# Patient Record
Sex: MALE | Race: OTHER | NOT HISPANIC OR LATINO | URBAN - METROPOLITAN AREA
[De-identification: names, ages, dates, MRNs, and addresses within clinical notes are randomized per-mention and may not be internally consistent; named-entity substitution may affect disease eponyms.]

---

## 2015-09-10 RX ORDER — FAMOTIDINE 10 MG/ML
1 INJECTION INTRAVENOUS
Qty: 0 | Refills: 0 | DISCHARGE
Start: 2015-09-10

## 2015-09-10 RX ORDER — ATORVASTATIN CALCIUM 80 MG/1
1 TABLET, FILM COATED ORAL
Qty: 0 | Refills: 0 | DISCHARGE
Start: 2015-09-10

## 2017-07-24 ENCOUNTER — INPATIENT (INPATIENT)
Facility: HOSPITAL | Age: 57
LOS: 0 days | Discharge: LEFT BEFORE DISCHARGE | End: 2017-07-25
Attending: INTERNAL MEDICINE | Admitting: INTERNAL MEDICINE
Payer: MEDICAID

## 2017-07-24 VITALS
SYSTOLIC BLOOD PRESSURE: 117 MMHG | OXYGEN SATURATION: 100 % | TEMPERATURE: 98 F | RESPIRATION RATE: 20 BRPM | HEART RATE: 80 BPM | DIASTOLIC BLOOD PRESSURE: 80 MMHG

## 2017-07-24 DIAGNOSIS — R07.9 CHEST PAIN, UNSPECIFIED: ICD-10-CM

## 2017-07-24 LAB
ALBUMIN SERPL ELPH-MCNC: 4.4 G/DL — SIGNIFICANT CHANGE UP (ref 3.3–5)
ALP SERPL-CCNC: 71 U/L — SIGNIFICANT CHANGE UP (ref 40–120)
ALT FLD-CCNC: 25 U/L — SIGNIFICANT CHANGE UP (ref 4–41)
AST SERPL-CCNC: 22 U/L — SIGNIFICANT CHANGE UP (ref 4–40)
BASOPHILS # BLD AUTO: 0.04 K/UL — SIGNIFICANT CHANGE UP (ref 0–0.2)
BASOPHILS NFR BLD AUTO: 0.5 % — SIGNIFICANT CHANGE UP (ref 0–2)
BASOPHILS NFR SPEC: 0 % — SIGNIFICANT CHANGE UP (ref 0–2)
BILIRUB SERPL-MCNC: 0.4 MG/DL — SIGNIFICANT CHANGE UP (ref 0.2–1.2)
BUN SERPL-MCNC: 23 MG/DL — SIGNIFICANT CHANGE UP (ref 7–23)
CALCIUM SERPL-MCNC: 9 MG/DL — SIGNIFICANT CHANGE UP (ref 8.4–10.5)
CHLORIDE SERPL-SCNC: 103 MMOL/L — SIGNIFICANT CHANGE UP (ref 98–107)
CK MB BLD-MCNC: 5.72 NG/ML — SIGNIFICANT CHANGE UP (ref 1–6.6)
CK SERPL-CCNC: 83 U/L — SIGNIFICANT CHANGE UP (ref 30–200)
CO2 SERPL-SCNC: 22 MMOL/L — SIGNIFICANT CHANGE UP (ref 22–31)
CREAT SERPL-MCNC: 1.28 MG/DL — SIGNIFICANT CHANGE UP (ref 0.5–1.3)
EOSINOPHIL # BLD AUTO: 0.01 K/UL — SIGNIFICANT CHANGE UP (ref 0–0.5)
EOSINOPHIL NFR BLD AUTO: 0.1 % — SIGNIFICANT CHANGE UP (ref 0–6)
EOSINOPHIL NFR FLD: 0 % — SIGNIFICANT CHANGE UP (ref 0–6)
GLUCOSE SERPL-MCNC: 117 MG/DL — HIGH (ref 70–99)
HCT VFR BLD CALC: 44.7 % — SIGNIFICANT CHANGE UP (ref 39–50)
HGB BLD-MCNC: 15.5 G/DL — SIGNIFICANT CHANGE UP (ref 13–17)
IMM GRANULOCYTES # BLD AUTO: 0.35 # — SIGNIFICANT CHANGE UP
IMM GRANULOCYTES NFR BLD AUTO: 4.1 % — HIGH (ref 0–1.5)
LYMPHOCYTES # BLD AUTO: 1.74 K/UL — SIGNIFICANT CHANGE UP (ref 1–3.3)
LYMPHOCYTES # BLD AUTO: 20.3 % — SIGNIFICANT CHANGE UP (ref 13–44)
LYMPHOCYTES NFR SPEC AUTO: 21 % — SIGNIFICANT CHANGE UP (ref 13–44)
MANUAL SMEAR VERIFICATION: SIGNIFICANT CHANGE UP
MCHC RBC-ENTMCNC: 31.6 PG — SIGNIFICANT CHANGE UP (ref 27–34)
MCHC RBC-ENTMCNC: 34.7 % — SIGNIFICANT CHANGE UP (ref 32–36)
MCV RBC AUTO: 91 FL — SIGNIFICANT CHANGE UP (ref 80–100)
MONOCYTES # BLD AUTO: 0.66 K/UL — SIGNIFICANT CHANGE UP (ref 0–0.9)
MONOCYTES NFR BLD AUTO: 7.7 % — SIGNIFICANT CHANGE UP (ref 2–14)
MONOCYTES NFR BLD: 7 % — SIGNIFICANT CHANGE UP (ref 2–9)
MORPHOLOGY BLD-IMP: NORMAL — SIGNIFICANT CHANGE UP
NEUTROPHIL AB SER-ACNC: 71 % — SIGNIFICANT CHANGE UP (ref 43–77)
NEUTROPHILS # BLD AUTO: 5.79 K/UL — SIGNIFICANT CHANGE UP (ref 1.8–7.4)
NEUTROPHILS NFR BLD AUTO: 67.3 % — SIGNIFICANT CHANGE UP (ref 43–77)
NEUTS BAND # BLD: 1 % — SIGNIFICANT CHANGE UP (ref 0–6)
NRBC # FLD: 0 — SIGNIFICANT CHANGE UP
NT-PROBNP SERPL-SCNC: 102.5 PG/ML — SIGNIFICANT CHANGE UP
PLATELET # BLD AUTO: 205 K/UL — SIGNIFICANT CHANGE UP (ref 150–400)
PLATELET COUNT - ESTIMATE: NORMAL — SIGNIFICANT CHANGE UP
PMV BLD: 10.8 FL — SIGNIFICANT CHANGE UP (ref 7–13)
POTASSIUM SERPL-MCNC: 5.3 MMOL/L — SIGNIFICANT CHANGE UP (ref 3.5–5.3)
POTASSIUM SERPL-SCNC: 5.3 MMOL/L — SIGNIFICANT CHANGE UP (ref 3.5–5.3)
PROT SERPL-MCNC: 7.1 G/DL — SIGNIFICANT CHANGE UP (ref 6–8.3)
RBC # BLD: 4.91 M/UL — SIGNIFICANT CHANGE UP (ref 4.2–5.8)
RBC # FLD: 13.7 % — SIGNIFICANT CHANGE UP (ref 10.3–14.5)
REVIEW TO FOLLOW: YES — SIGNIFICANT CHANGE UP
SODIUM SERPL-SCNC: 139 MMOL/L — SIGNIFICANT CHANGE UP (ref 135–145)
TROPONIN T SERPL-MCNC: < 0.06 NG/ML — SIGNIFICANT CHANGE UP (ref 0–0.06)
WBC # BLD: 8.59 K/UL — SIGNIFICANT CHANGE UP (ref 3.8–10.5)
WBC # FLD AUTO: 8.59 K/UL — SIGNIFICANT CHANGE UP (ref 3.8–10.5)

## 2017-07-24 PROCEDURE — 71020: CPT | Mod: 26

## 2017-07-24 RX ORDER — ASPIRIN/CALCIUM CARB/MAGNESIUM 324 MG
162 TABLET ORAL ONCE
Qty: 0 | Refills: 0 | Status: COMPLETED | OUTPATIENT
Start: 2017-07-24 | End: 2017-07-24

## 2017-07-24 RX ADMIN — Medication 162 MILLIGRAM(S): at 19:23

## 2017-07-24 NOTE — ED PROVIDER NOTE - MEDICAL DECISION MAKING DETAILS
57 M with intermittent chest pain x months, that worsened today, with leg edema. VSS, exam wnl. EKG, cxr, CE and pro BNP

## 2017-07-24 NOTE — ED PROVIDER NOTE - PMH
CAD (coronary artery disease)    HLD (hyperlipidemia)    HTN (hypertension)    Skin disorder  Eczema on b/l LE

## 2017-07-24 NOTE — ED PROVIDER NOTE - PROGRESS NOTE DETAILS
Spoke with Dr beckwith, says patient had stress test from May showing inf/lat wall motion abnormality. Requesting admission to his service

## 2017-07-24 NOTE — ED ADULT NURSE NOTE - OBJECTIVE STATEMENT
Pt c/o intermittent CP 1-2/10 over several months.  Appears comfortable.  A+OX3, family at bedside, Kyrgyz speaking, pt ok with family translating.  CM placed.  EKG done.  Labs obtained and sent as ordered.  #20g SL R AC placed.  Pt to XR

## 2017-07-24 NOTE — ED PROVIDER NOTE - OBJECTIVE STATEMENT
Marlys  #[id number] 57 M with CAD s/p stent, with chest tightness and SOB x months that is improved with walking. Also with epigstric burning pain. Bilat LE swelling that noticed today, and increase in severity of chest tightness so came to ED. No fevers/chils/n/v/d. Marlys  #[id number] 57 M with CAD s/p stent, with chest tightness and SOB x months that is improved with walking. Also with epigstric burning pain. Bilat LE swelling that noticed today, and increase in severity of chest tightness so came to ED. No fevers/chils/n/v/d. Also follows with Dr douglas Marlys  #[id number] 57 M with CAD s/p stent, with chest tightness and SOB x months that is improved with walking. Also with epigstric burning pain. Bilat LE swelling that noticed today, and increase in severity of chest tightness so came to ED. No fevers/chils/n/v/d. Also follows with Dr sewell - cardiology. Was seen this weekm, told he may need angio but patient still with leg swelling so didn't trust the cardiologist Marlys  #[id number] 57 M with CAD s/p stent, with chest tightness and SOB x months that is improved with walking. Also with epigstric burning pain. Bilat LE swelling that noticed today, and increase in severity of chest tightness so came to ED. No fevers/chils/n/v/d. Also follows with Dr sewell - cardiology. Was seen this weekm, told he may need angio but patient still with leg swelling since his visit so he doesn't trust the cardiologist, and is requesting to see Dr hackett who did his cath. Currently pain free. Marlys  #[id number] 57 M with CAD s/p stent, with chest tightness and SOB x months that is improved with walking. Also with epigstric burning pain. Bilat LE swelling that noticed today, and increase in severity of chest tightness so came to ED. No fevers/chils/n/v/d. Also follows with Dr Michaels - cardiology. Was seen this week, told he may need cath but patient still with leg swelling since his visit so he doesn't trust the cardiologist, and is requesting to see Dr hackett who did his cath. Currently pain free.

## 2017-07-24 NOTE — ED PROVIDER NOTE - ATTENDING CONTRIBUTION TO CARE
agree with resident note  57 M with CAD s/p stent, with chest tightness and SOB who presents wanting to see his interventionalist.  States insurance has changed and can not see him as outpatient.  States CP improves with walking.  States this feels like when he had initial stent placed.      PE: well appearing, VSS, CTAB/L; s1 s2 no m/r/g abd soft/NT/ND ext: no edema

## 2017-07-25 ENCOUNTER — TRANSCRIPTION ENCOUNTER (OUTPATIENT)
Age: 57
End: 2017-07-25

## 2017-07-25 VITALS
HEART RATE: 65 BPM | DIASTOLIC BLOOD PRESSURE: 72 MMHG | SYSTOLIC BLOOD PRESSURE: 113 MMHG | TEMPERATURE: 98 F | RESPIRATION RATE: 18 BRPM | OXYGEN SATURATION: 99 %

## 2017-07-25 DIAGNOSIS — I10 ESSENTIAL (PRIMARY) HYPERTENSION: ICD-10-CM

## 2017-07-25 DIAGNOSIS — R07.9 CHEST PAIN, UNSPECIFIED: ICD-10-CM

## 2017-07-25 DIAGNOSIS — E78.5 HYPERLIPIDEMIA, UNSPECIFIED: ICD-10-CM

## 2017-07-25 DIAGNOSIS — I25.10 ATHEROSCLEROTIC HEART DISEASE OF NATIVE CORONARY ARTERY WITHOUT ANGINA PECTORIS: ICD-10-CM

## 2017-07-25 DIAGNOSIS — Z29.9 ENCOUNTER FOR PROPHYLACTIC MEASURES, UNSPECIFIED: ICD-10-CM

## 2017-07-25 LAB
BUN SERPL-MCNC: 18 MG/DL — SIGNIFICANT CHANGE UP (ref 7–23)
CALCIUM SERPL-MCNC: 8.8 MG/DL — SIGNIFICANT CHANGE UP (ref 8.4–10.5)
CHLORIDE SERPL-SCNC: 101 MMOL/L — SIGNIFICANT CHANGE UP (ref 98–107)
CHOLEST SERPL-MCNC: 139 MG/DL — SIGNIFICANT CHANGE UP (ref 120–199)
CK MB BLD-MCNC: 4.25 NG/ML — SIGNIFICANT CHANGE UP (ref 1–6.6)
CK SERPL-CCNC: 51 U/L — SIGNIFICANT CHANGE UP (ref 30–200)
CO2 SERPL-SCNC: 22 MMOL/L — SIGNIFICANT CHANGE UP (ref 22–31)
CREAT SERPL-MCNC: 1.11 MG/DL — SIGNIFICANT CHANGE UP (ref 0.5–1.3)
GLUCOSE SERPL-MCNC: 115 MG/DL — HIGH (ref 70–99)
HBA1C BLD-MCNC: 5.6 % — SIGNIFICANT CHANGE UP (ref 4–5.6)
HCT VFR BLD CALC: 41.9 % — SIGNIFICANT CHANGE UP (ref 39–50)
HDLC SERPL-MCNC: 58 MG/DL — HIGH (ref 35–55)
HGB BLD-MCNC: 14.4 G/DL — SIGNIFICANT CHANGE UP (ref 13–17)
LIPID PNL WITH DIRECT LDL SERPL: 61 MG/DL — SIGNIFICANT CHANGE UP
MAGNESIUM SERPL-MCNC: 1.7 MG/DL — SIGNIFICANT CHANGE UP (ref 1.6–2.6)
MCHC RBC-ENTMCNC: 31.2 PG — SIGNIFICANT CHANGE UP (ref 27–34)
MCHC RBC-ENTMCNC: 34.4 % — SIGNIFICANT CHANGE UP (ref 32–36)
MCV RBC AUTO: 90.7 FL — SIGNIFICANT CHANGE UP (ref 80–100)
NRBC # FLD: 0 — SIGNIFICANT CHANGE UP
PHOSPHATE SERPL-MCNC: 4.5 MG/DL — SIGNIFICANT CHANGE UP (ref 2.5–4.5)
PLATELET # BLD AUTO: 184 K/UL — SIGNIFICANT CHANGE UP (ref 150–400)
PMV BLD: 11.2 FL — SIGNIFICANT CHANGE UP (ref 7–13)
POTASSIUM SERPL-MCNC: 3.5 MMOL/L — SIGNIFICANT CHANGE UP (ref 3.5–5.3)
POTASSIUM SERPL-SCNC: 3.5 MMOL/L — SIGNIFICANT CHANGE UP (ref 3.5–5.3)
RBC # BLD: 4.62 M/UL — SIGNIFICANT CHANGE UP (ref 4.2–5.8)
RBC # FLD: 13.9 % — SIGNIFICANT CHANGE UP (ref 10.3–14.5)
SODIUM SERPL-SCNC: 139 MMOL/L — SIGNIFICANT CHANGE UP (ref 135–145)
TRIGL SERPL-MCNC: 123 MG/DL — SIGNIFICANT CHANGE UP (ref 10–149)
TROPONIN T SERPL-MCNC: < 0.06 NG/ML — SIGNIFICANT CHANGE UP (ref 0–0.06)
TSH SERPL-MCNC: 1.8 UIU/ML — SIGNIFICANT CHANGE UP (ref 0.27–4.2)
WBC # BLD: 11.83 K/UL — HIGH (ref 3.8–10.5)
WBC # FLD AUTO: 11.83 K/UL — HIGH (ref 3.8–10.5)

## 2017-07-25 RX ORDER — ISOSORBIDE MONONITRATE 60 MG/1
60 TABLET, EXTENDED RELEASE ORAL DAILY
Qty: 0 | Refills: 0 | Status: DISCONTINUED | OUTPATIENT
Start: 2017-07-25 | End: 2017-07-25

## 2017-07-25 RX ORDER — LOSARTAN POTASSIUM 100 MG/1
100 TABLET, FILM COATED ORAL DAILY
Qty: 0 | Refills: 0 | Status: DISCONTINUED | OUTPATIENT
Start: 2017-07-25 | End: 2017-07-25

## 2017-07-25 RX ORDER — RANOLAZINE 500 MG/1
500 TABLET, FILM COATED, EXTENDED RELEASE ORAL
Qty: 0 | Refills: 0 | Status: DISCONTINUED | OUTPATIENT
Start: 2017-07-25 | End: 2017-07-25

## 2017-07-25 RX ORDER — HEPARIN SODIUM 5000 [USP'U]/ML
5000 INJECTION INTRAVENOUS; SUBCUTANEOUS EVERY 12 HOURS
Qty: 0 | Refills: 0 | Status: DISCONTINUED | OUTPATIENT
Start: 2017-07-25 | End: 2017-07-25

## 2017-07-25 RX ORDER — METOPROLOL TARTRATE 50 MG
150 TABLET ORAL DAILY
Qty: 0 | Refills: 0 | Status: DISCONTINUED | OUTPATIENT
Start: 2017-07-25 | End: 2017-07-25

## 2017-07-25 RX ORDER — FAMOTIDINE 10 MG/ML
20 INJECTION INTRAVENOUS DAILY
Qty: 0 | Refills: 0 | Status: DISCONTINUED | OUTPATIENT
Start: 2017-07-25 | End: 2017-07-25

## 2017-07-25 RX ORDER — ATORVASTATIN CALCIUM 80 MG/1
80 TABLET, FILM COATED ORAL AT BEDTIME
Qty: 0 | Refills: 0 | Status: DISCONTINUED | OUTPATIENT
Start: 2017-07-25 | End: 2017-07-25

## 2017-07-25 RX ORDER — PRASUGREL 5 MG/1
10 TABLET, FILM COATED ORAL DAILY
Qty: 0 | Refills: 0 | Status: DISCONTINUED | OUTPATIENT
Start: 2017-07-25 | End: 2017-07-25

## 2017-07-25 RX ORDER — ASPIRIN/CALCIUM CARB/MAGNESIUM 324 MG
81 TABLET ORAL DAILY
Qty: 0 | Refills: 0 | Status: DISCONTINUED | OUTPATIENT
Start: 2017-07-25 | End: 2017-07-25

## 2017-07-25 RX ADMIN — Medication 150 MILLIGRAM(S): at 06:03

## 2017-07-25 RX ADMIN — PRASUGREL 10 MILLIGRAM(S): 5 TABLET, FILM COATED ORAL at 13:08

## 2017-07-25 RX ADMIN — FAMOTIDINE 20 MILLIGRAM(S): 10 INJECTION INTRAVENOUS at 13:56

## 2017-07-25 RX ADMIN — Medication 81 MILLIGRAM(S): at 13:08

## 2017-07-25 RX ADMIN — ISOSORBIDE MONONITRATE 60 MILLIGRAM(S): 60 TABLET, EXTENDED RELEASE ORAL at 13:08

## 2017-07-25 RX ADMIN — HEPARIN SODIUM 5000 UNIT(S): 5000 INJECTION INTRAVENOUS; SUBCUTANEOUS at 06:02

## 2017-07-25 RX ADMIN — RANOLAZINE 500 MILLIGRAM(S): 500 TABLET, FILM COATED, EXTENDED RELEASE ORAL at 06:03

## 2017-07-25 RX ADMIN — LOSARTAN POTASSIUM 100 MILLIGRAM(S): 100 TABLET, FILM COATED ORAL at 06:03

## 2017-07-25 NOTE — DISCHARGE NOTE ADULT - CARE PROVIDER_API CALL
Santy Michaels), Cardiovascular Disease; Internal Medicine; Nuclear Cardiology  [address] [place] [address]  Phone: [phone]  Fax: [phone]

## 2017-07-25 NOTE — DISCHARGE NOTE ADULT - PATIENT PORTAL LINK FT
“You can access the [facility] Patient Portal, offered by [facility], by registering with the following website: [email or web address]”

## 2017-07-25 NOTE — DISCHARGE NOTE ADULT - PLAN OF CARE
prevent future episode please continue home medication and follow up with your cardiologist in 1-2 week continue medication Continue current medications and low fat diet. Continue medications as currently prescribed. Follow up with your PMD for further management of disease. Dash diet.

## 2017-07-25 NOTE — H&P ADULT - NSHPPHYSICALEXAM_GEN_ALL_CORE
GENERAL APPEARANCE: Well developed, well nourished, alert and cooperative, and appears to be in no acute distress.  HEAD: normocephalic.  EYES: PERRL, EOMI.   EARS: External auditory canals clear, hearing grossly intact.  NECK: Neck supple, non-tender without lymphadenopathy, masses or thyromegaly.  CARDIAC: Normal S1 and S2. No S3, S4 or murmurs. Rhythm is regular.   LUNGS: Clear to auscultation and percussion without rales, rhonchi, wheezing or diminished breath sounds.  ABDOMEN: Positive bowel sounds. Soft, nondistended, nontender. No guarding or rebound. No masses.  EXTREMITIES: No significant deformity or joint abnormality. No edema. Peripheral pulses intact. 2+ edema bilaterally on the lower extremities.   NEUROLOGICAL: Strength and sensation symmetric and intact throughout.   SKIN: Skin normal color, texture.  PSYCHIATRIC: The mental examination revealed the patient was oriented to person, place, and time. The patient was able to demonstrate good judgement and reason, without hallucinations, abnormal affect or abnormal behaviors during the examination. Patient is not suicidal.

## 2017-07-25 NOTE — DISCHARGE NOTE ADULT - MEDICATION SUMMARY - MEDICATIONS TO TAKE
I will START or STAY ON the medications listed below when I get home from the hospital:    aspirin 81 mg oral delayed release tablet  -- 1 tab(s) by mouth once a day  -- Indication: For CAD (coronary artery disease)    losartan 100 mg oral tablet  -- 1 tab(s) by mouth once a day  -- Indication: For HTN (hypertension)    isosorbide mononitrate 60 mg oral tablet, extended release  -- 1 tab(s) by mouth once a day (in the morning)  -- Indication: For angina    Ranexa 500 mg oral tablet, extended release  -- 1 tab(s) by mouth 2 times a day  -- Indication: For angina    atorvastatin 80 mg oral tablet  -- 1 tab(s) by mouth once a day  -- Indication: For HLD (hyperlipidemia)    Effient 10 mg oral tablet  -- 1 tab(s) by mouth once a day  -- Indication: For CAD (coronary artery disease)    metoprolol succinate 100 mg oral tablet, extended release  -- 1.5 tab(s) by mouth once a day  -- Indication: For HTN (hypertension)    famotidine 20 mg oral tablet  -- 1 tab(s) by mouth once a day  -- Indication: For gerd

## 2017-07-25 NOTE — H&P ADULT - NSHPREVIEWOFSYSTEMS_GEN_ALL_CORE
Constitutional: No fever, fatigue or weight loss.  Skin: No rash.  Eyes: No recent vision problems or eye pain.  ENT: No congestion, ear pain, or sore throat.  Endocrine: No thyroid problems.  Cardiovascular: + chest pain. No palpitations.   Respiratory: No cough, shortness of breath, congestion, or wheezing.  Gastrointestinal: No abdominal pain, nausea, vomiting, or diarrhea.  Genitourinary: No dysuria.  Musculoskeletal: No joint swelling.  Neurologic: No headache.

## 2017-07-25 NOTE — H&P ADULT - PROBLEM SELECTOR PLAN 1
Admit to telemetry.   Trend CE. Consider ischemic evaluation.   check cbc,tsh,lipid, hemoglobin a1c, bmp with mag and phos.   f/u MD note

## 2017-07-25 NOTE — H&P ADULT - FAMILY HISTORY
Father  Still living? Unknown  Family history of cerebrovascular accident (CVA), Age at diagnosis: Age Unknown

## 2017-07-25 NOTE — DISCHARGE NOTE ADULT - CARE PLAN
Principal Discharge DX:	Chest pain  Goal:	prevent future episode  Instructions for follow-up, activity and diet:	please continue home medication and follow up with your cardiologist in 1-2 week  Secondary Diagnosis:	CAD (coronary artery disease)  Instructions for follow-up, activity and diet:	continue medication  Secondary Diagnosis:	HLD (hyperlipidemia)  Instructions for follow-up, activity and diet:	Continue current medications and low fat diet.  Secondary Diagnosis:	HTN (hypertension)  Instructions for follow-up, activity and diet:	Continue medications as currently prescribed. Follow up with your PMD for further management of disease. Dash diet.

## 2017-07-25 NOTE — H&P ADULT - NSHPLABSRESULTS_GEN_ALL_CORE
LABS:                        15.5   8.59  )-----------( 205      ( 24 Jul 2017 18:56 )             44.7     07-24    139  |  103  |  23  ----------------------------<  117<H>  5.3   |  22  |  1.28    Ca    9.0      24 Jul 2017 18:56    TPro  7.1  /  Alb  4.4  /  TBili  0.4  /  DBili  x   /  AST  22  /  ALT  25  /  AlkPhos  71  07-24        CAPILLARY BLOOD GLUCOSE      CARDIAC MARKERS ( 24 Jul 2017 18:56 )  x     / < 0.06 ng/mL / 83 u/L / 5.72 ng/mL / x        EKG shows NSR @ 71 bpm  ms

## 2017-07-25 NOTE — H&P ADULT - ASSESSMENT
56 y/o M with CAD s/p 3 stents, HTN, HLD, eczema presents to the ED for chest pain. Admit to telemetry to r/o ACS.

## 2017-07-25 NOTE — H&P ADULT - ATTENDING COMMENTS
pt seen and examined agree with plan above   EKG - normal     CE - negative     a/p     1) Chest pain - + stress test as out patient shows moderate inferolateral wall ischemia EF 60%, cont asa effient, NPO for cath tomorrow    2) htn - cont toprol imdur and losartan    3) DVT prophylasix - on sc heaprin

## 2017-07-25 NOTE — H&P ADULT - HISTORY OF PRESENT ILLNESS
56 y/o M with CAD s/p 3 stents, HTN, HLD, eczema presents to the ED for chest pain.  Pt states he had an episode of chest pain today for 2 hours. Pt states the chest pain was midsternal, with no radiation, worse with exertion and better with rest. Pt states the chest pain is nonpleuritic. Pt also states he has bilateral LE swelling for about a week. Pt also states his last stress test was 3 months ago and the results were abnormal. Pt denies LOC, syncope, fever, chills, shortness of breath, palpitations, N/V/D/C, numbness, tingling, dysuria, urinary/bowel incontinence or any other complaints at this time.

## 2017-08-08 ENCOUNTER — RECORD ABSTRACTING (OUTPATIENT)
Age: 57
End: 2017-08-08

## 2017-08-08 DIAGNOSIS — I25.10 ATHEROSCLEROTIC HEART DISEASE OF NATIVE CORONARY ARTERY W/OUT ANGINA PECTORIS: ICD-10-CM

## 2017-08-08 DIAGNOSIS — E78.5 HYPERLIPIDEMIA, UNSPECIFIED: ICD-10-CM

## 2017-08-08 DIAGNOSIS — I10 ESSENTIAL (PRIMARY) HYPERTENSION: ICD-10-CM

## 2017-08-08 DIAGNOSIS — Z87.898 PERSONAL HISTORY OF OTHER SPECIFIED CONDITIONS: ICD-10-CM

## 2017-08-08 DIAGNOSIS — K21.9 GASTRO-ESOPHAGEAL REFLUX DISEASE W/OUT ESOPHAGITIS: ICD-10-CM

## 2017-08-09 ENCOUNTER — APPOINTMENT (OUTPATIENT)
Dept: CARDIOLOGY | Facility: CLINIC | Age: 57
End: 2017-08-09
Payer: MEDICAID

## 2017-08-09 ENCOUNTER — NON-APPOINTMENT (OUTPATIENT)
Age: 57
End: 2017-08-09

## 2017-08-09 VITALS
HEART RATE: 60 BPM | WEIGHT: 180 LBS | HEIGHT: 66 IN | RESPIRATION RATE: 16 BRPM | OXYGEN SATURATION: 99 % | SYSTOLIC BLOOD PRESSURE: 130 MMHG | BODY MASS INDEX: 28.93 KG/M2 | DIASTOLIC BLOOD PRESSURE: 90 MMHG

## 2017-08-09 DIAGNOSIS — R60.9 EDEMA, UNSPECIFIED: ICD-10-CM

## 2017-08-09 DIAGNOSIS — I73.9 PERIPHERAL VASCULAR DISEASE, UNSPECIFIED: ICD-10-CM

## 2017-08-09 PROCEDURE — 99215 OFFICE O/P EST HI 40 MIN: CPT

## 2017-08-09 PROCEDURE — 93000 ELECTROCARDIOGRAM COMPLETE: CPT

## 2017-08-18 ENCOUNTER — APPOINTMENT (OUTPATIENT)
Dept: CV DIAGNOSITCS | Facility: HOSPITAL | Age: 57
End: 2017-08-18
Payer: MEDICAID

## 2017-08-18 ENCOUNTER — OUTPATIENT (OUTPATIENT)
Dept: OUTPATIENT SERVICES | Facility: HOSPITAL | Age: 57
LOS: 1 days | End: 2017-08-18

## 2017-08-18 DIAGNOSIS — I73.9 PERIPHERAL VASCULAR DISEASE, UNSPECIFIED: ICD-10-CM

## 2017-08-18 DIAGNOSIS — R60.9 EDEMA, UNSPECIFIED: ICD-10-CM

## 2017-08-18 PROCEDURE — 93923 UPR/LXTR ART STDY 3+ LVLS: CPT | Mod: 26

## 2017-09-20 ENCOUNTER — NON-APPOINTMENT (OUTPATIENT)
Age: 57
End: 2017-09-20

## 2017-09-20 ENCOUNTER — APPOINTMENT (OUTPATIENT)
Dept: CARDIOLOGY | Facility: CLINIC | Age: 57
End: 2017-09-20
Payer: MEDICAID

## 2017-09-20 VITALS
DIASTOLIC BLOOD PRESSURE: 85 MMHG | OXYGEN SATURATION: 100 % | RESPIRATION RATE: 14 BRPM | HEART RATE: 55 BPM | SYSTOLIC BLOOD PRESSURE: 136 MMHG

## 2017-09-20 PROCEDURE — 99215 OFFICE O/P EST HI 40 MIN: CPT

## 2017-09-20 PROCEDURE — 93000 ELECTROCARDIOGRAM COMPLETE: CPT

## 2017-09-20 RX ORDER — ISOSORBIDE MONONITRATE 60 MG/1
60 TABLET, EXTENDED RELEASE ORAL DAILY
Qty: 90 | Refills: 3 | Status: DISCONTINUED | COMMUNITY
End: 2017-09-20

## 2017-09-20 RX ORDER — PRASUGREL HYDROCHLORIDE 10 MG/1
10 TABLET, COATED ORAL
Qty: 90 | Refills: 3 | Status: DISCONTINUED | COMMUNITY
End: 2017-09-20

## 2018-01-03 ENCOUNTER — APPOINTMENT (OUTPATIENT)
Dept: CARDIOLOGY | Facility: CLINIC | Age: 58
End: 2018-01-03

## 2018-02-14 ENCOUNTER — APPOINTMENT (OUTPATIENT)
Dept: CARDIOLOGY | Facility: CLINIC | Age: 58
End: 2018-02-14
Payer: MEDICAID

## 2018-02-14 ENCOUNTER — NON-APPOINTMENT (OUTPATIENT)
Age: 58
End: 2018-02-14

## 2018-02-14 VITALS
DIASTOLIC BLOOD PRESSURE: 80 MMHG | OXYGEN SATURATION: 98 % | HEART RATE: 70 BPM | BODY MASS INDEX: 29.73 KG/M2 | SYSTOLIC BLOOD PRESSURE: 129 MMHG | HEIGHT: 66 IN | WEIGHT: 185 LBS

## 2018-02-14 PROCEDURE — 93000 ELECTROCARDIOGRAM COMPLETE: CPT

## 2018-02-14 PROCEDURE — 99215 OFFICE O/P EST HI 40 MIN: CPT

## 2018-08-15 ENCOUNTER — APPOINTMENT (OUTPATIENT)
Dept: CARDIOLOGY | Facility: CLINIC | Age: 58
End: 2018-08-15

## 2018-10-24 ENCOUNTER — APPOINTMENT (OUTPATIENT)
Dept: CARDIOLOGY | Facility: CLINIC | Age: 58
End: 2018-10-24
Payer: MEDICAID

## 2018-10-24 VITALS
SYSTOLIC BLOOD PRESSURE: 133 MMHG | BODY MASS INDEX: 29.73 KG/M2 | WEIGHT: 185 LBS | OXYGEN SATURATION: 97 % | DIASTOLIC BLOOD PRESSURE: 79 MMHG | RESPIRATION RATE: 16 BRPM | HEART RATE: 76 BPM | HEIGHT: 66 IN

## 2018-10-24 PROCEDURE — 93000 ELECTROCARDIOGRAM COMPLETE: CPT

## 2018-10-24 PROCEDURE — 99215 OFFICE O/P EST HI 40 MIN: CPT

## 2019-05-01 ENCOUNTER — APPOINTMENT (OUTPATIENT)
Dept: CARDIOLOGY | Facility: CLINIC | Age: 59
End: 2019-05-01

## 2019-08-14 ENCOUNTER — APPOINTMENT (OUTPATIENT)
Dept: CARDIOLOGY | Facility: CLINIC | Age: 59
End: 2019-08-14
Payer: MEDICAID

## 2019-08-14 VITALS
HEART RATE: 61 BPM | HEIGHT: 66 IN | WEIGHT: 193 LBS | OXYGEN SATURATION: 99 % | SYSTOLIC BLOOD PRESSURE: 150 MMHG | DIASTOLIC BLOOD PRESSURE: 93 MMHG | BODY MASS INDEX: 31.02 KG/M2

## 2019-08-14 PROCEDURE — 93000 ELECTROCARDIOGRAM COMPLETE: CPT

## 2019-08-14 PROCEDURE — 99214 OFFICE O/P EST MOD 30 MIN: CPT

## 2019-08-14 RX ORDER — ASPIRIN 81 MG
81 TABLET, DELAYED RELEASE (ENTERIC COATED) ORAL DAILY
Qty: 90 | Refills: 3 | Status: ACTIVE | COMMUNITY

## 2019-08-14 NOTE — PHYSICAL EXAM
[General Appearance - Well Developed] : well developed [General Appearance - Well Nourished] : well nourished [Normal Conjunctiva] : the conjunctiva exhibited no abnormalities [Normal Oropharynx] : normal oropharynx [Normal Oral Mucosa] : normal oral mucosa [Normal Jugular Venous V Waves Present] : normal jugular venous V waves present [] : no respiratory distress [Respiration, Rhythm And Depth] : normal respiratory rhythm and effort [Heart Sounds] : normal S1 and S2 [Heart Rate And Rhythm] : heart rate and rhythm were normal [Bowel Sounds] : normal bowel sounds [Abnormal Walk] : normal gait [FreeTextEntry1] : trace edema. [Skin Color & Pigmentation] : normal skin color and pigmentation [Oriented To Time, Place, And Person] : oriented to person, place, and time

## 2019-08-14 NOTE — HISTORY OF PRESENT ILLNESS
[FreeTextEntry1] : 59 year HTN, Hyperlipidemia, CAD s.p PCI with ROBERTO to LAD and LCX in 2015, Normal LVEF. \par \par Clinically doing well with no chest pain , dyspnea, palpitations, syncope, or presyncope. \par \par HTN: At goal. no symptoms\par \par CAD: No angina.\par

## 2019-08-14 NOTE — DISCUSSION/SUMMARY
[FreeTextEntry1] : 1. CAD: asymptomatic. continue witH asa  indefinitely and aggressive risk factor modification . he has residual cad in small to medium sized OM branch and remains asymptomatic with combination of antianginals\par \par 2. HTN:BP  stable, chronic,  FU with PCP\par \par 3. Hyperlipidemia: FU with PCP. continue statin\par \par 4. Venous insufficiency: Compression stocking and leg elevation\par

## 2020-03-18 ENCOUNTER — APPOINTMENT (OUTPATIENT)
Dept: CARDIOLOGY | Facility: CLINIC | Age: 60
End: 2020-03-18

## 2020-09-30 ENCOUNTER — NON-APPOINTMENT (OUTPATIENT)
Age: 60
End: 2020-09-30

## 2020-09-30 ENCOUNTER — APPOINTMENT (OUTPATIENT)
Dept: CARDIOLOGY | Facility: CLINIC | Age: 60
End: 2020-09-30
Payer: MEDICAID

## 2020-09-30 VITALS
HEIGHT: 66 IN | OXYGEN SATURATION: 98 % | HEART RATE: 71 BPM | TEMPERATURE: 97.9 F | SYSTOLIC BLOOD PRESSURE: 153 MMHG | BODY MASS INDEX: 31.66 KG/M2 | DIASTOLIC BLOOD PRESSURE: 93 MMHG | WEIGHT: 197 LBS

## 2020-09-30 PROCEDURE — 99214 OFFICE O/P EST MOD 30 MIN: CPT

## 2020-09-30 PROCEDURE — 93000 ELECTROCARDIOGRAM COMPLETE: CPT

## 2020-09-30 NOTE — HISTORY OF PRESENT ILLNESS
[FreeTextEntry1] : 60 year HTN, Hyperlipidemia, CAD s.p PCI with ROBERTO to LAD and LCX in 2015, Normal LVEF. \par \par Clinically doing well with no chest pain , dyspnea, palpitations, syncope, or presyncope. \par \par HTN: elevated. Patient is out of some meds, he  just cam eback from [facility]. \par \par CAD: No angina.\par

## 2020-09-30 NOTE — DISCUSSION/SUMMARY
[FreeTextEntry1] : 1. CAD: asymptomatic. continue witH asa  indefinitely and aggressive risk factor modification . he has residual cad in small to medium sized OM branch and remains asymptomatic with combination of antianginals\par \par 2. HTN:BP  elevated , chronic,  FU with PCP. renewed all meds today and fu PCP\par \par 3. Hyperlipidemia: FU with PCP. continue statin\par

## 2020-09-30 NOTE — PHYSICAL EXAM
[General Appearance - Well Developed] : well developed [General Appearance - Well Nourished] : well nourished [Normal Conjunctiva] : the conjunctiva exhibited no abnormalities [Normal Oral Mucosa] : normal oral mucosa [Normal Oropharynx] : normal oropharynx [Normal Jugular Venous V Waves Present] : normal jugular venous V waves present [] : no respiratory distress [Respiration, Rhythm And Depth] : normal respiratory rhythm and effort [Heart Rate And Rhythm] : heart rate and rhythm were normal [Heart Sounds] : normal S1 and S2 [Bowel Sounds] : normal bowel sounds [Abnormal Walk] : normal gait [Skin Color & Pigmentation] : normal skin color and pigmentation [Oriented To Time, Place, And Person] : oriented to person, place, and time [FreeTextEntry1] : trace edema.

## 2020-10-28 ENCOUNTER — NON-APPOINTMENT (OUTPATIENT)
Age: 60
End: 2020-10-28

## 2020-10-28 ENCOUNTER — APPOINTMENT (OUTPATIENT)
Dept: CARDIOLOGY | Facility: CLINIC | Age: 60
End: 2020-10-28
Payer: MEDICAID

## 2020-10-28 VITALS
OXYGEN SATURATION: 98 % | HEART RATE: 69 BPM | WEIGHT: 195 LBS | BODY MASS INDEX: 31.34 KG/M2 | TEMPERATURE: 97 F | DIASTOLIC BLOOD PRESSURE: 99 MMHG | SYSTOLIC BLOOD PRESSURE: 160 MMHG | HEIGHT: 66 IN | RESPIRATION RATE: 16 BRPM

## 2020-10-28 PROCEDURE — 93000 ELECTROCARDIOGRAM COMPLETE: CPT

## 2020-10-28 PROCEDURE — 99072 ADDL SUPL MATRL&STAF TM PHE: CPT

## 2020-10-28 PROCEDURE — 99214 OFFICE O/P EST MOD 30 MIN: CPT

## 2020-10-28 RX ORDER — FUROSEMIDE 20 MG/1
20 TABLET ORAL
Qty: 30 | Refills: 3 | Status: DISCONTINUED | COMMUNITY
Start: 2017-08-09 | End: 2020-10-28

## 2020-10-28 NOTE — DISCUSSION/SUMMARY
[FreeTextEntry1] : 1. CAD: asymptomatic. continue witH asa  indefinitely and aggressive risk factor modification . he has residual cad in small to medium sized OM branch and remains asymptomatic with combination of antianginals\par \par 2. HTN:BP  elevated , chronic,  FU with PCP. add HCTZ\par \par 3. Hyperlipidemia: FU with PCP. continue statin\par

## 2020-10-28 NOTE — HISTORY OF PRESENT ILLNESS
[FreeTextEntry1] : 60 year HTN, Hyperlipidemia, CAD s.p PCI with ROBERTO to LAD and LCX in 2015, Normal LVEF. \par \par Clinically doing well with no chest pain , dyspnea, palpitations, syncope, or presyncope. \par \par HTN: elevated despite current meds\par \par CAD: No angina.\par

## 2020-12-16 ENCOUNTER — APPOINTMENT (OUTPATIENT)
Dept: CARDIOLOGY | Facility: CLINIC | Age: 60
End: 2020-12-16

## 2021-06-09 ENCOUNTER — NON-APPOINTMENT (OUTPATIENT)
Age: 61
End: 2021-06-09

## 2021-06-09 ENCOUNTER — APPOINTMENT (OUTPATIENT)
Dept: CARDIOLOGY | Facility: CLINIC | Age: 61
End: 2021-06-09
Payer: MEDICAID

## 2021-06-09 VITALS — WEIGHT: 191 LBS | BODY MASS INDEX: 30.83 KG/M2

## 2021-06-09 VITALS
DIASTOLIC BLOOD PRESSURE: 95 MMHG | OXYGEN SATURATION: 99 % | BODY MASS INDEX: 30.83 KG/M2 | SYSTOLIC BLOOD PRESSURE: 161 MMHG | HEART RATE: 61 BPM | HEIGHT: 66 IN | TEMPERATURE: 97 F

## 2021-06-09 PROCEDURE — 99214 OFFICE O/P EST MOD 30 MIN: CPT

## 2021-06-09 PROCEDURE — 93000 ELECTROCARDIOGRAM COMPLETE: CPT

## 2021-06-09 NOTE — PHYSICAL EXAM
[General Appearance - Well Developed] : well developed [General Appearance - Well Nourished] : well nourished [Normal Conjunctiva] : the conjunctiva exhibited no abnormalities [Normal Oral Mucosa] : normal oral mucosa [Normal Oropharynx] : normal oropharynx [Normal Jugular Venous V Waves Present] : normal jugular venous V waves present [] : no respiratory distress [Respiration, Rhythm And Depth] : normal respiratory rhythm and effort [Heart Rate And Rhythm] : heart rate and rhythm were normal [Heart Sounds] : normal S1 and S2 [Bowel Sounds] : normal bowel sounds [Abnormal Walk] : normal gait [FreeTextEntry1] : trace edema. [Skin Color & Pigmentation] : normal skin color and pigmentation [Oriented To Time, Place, And Person] : oriented to person, place, and time

## 2021-06-09 NOTE — HISTORY OF PRESENT ILLNESS
[FreeTextEntry1] : 60 year HTN, Hyperlipidemia, CAD s.p PCI with ROBERTO to LAD and LCX in 2015, Normal LVEF. \par \par Clinically doing well with no chest pain , dyspnea, palpitations, syncope, or presyncope. \par \par HTN: elevated , as per daughter patient forgot to take his meds this morning. \par \par CAD: No angina.\par

## 2021-08-05 NOTE — ED PROVIDER NOTE - NSCAREINITIATED _GEN_ER
Malou Bhardwaj(Resident) Advancement Flap (Single) Text: The defect edges were debeveled with a #15 scalpel blade.  Given the location of the defect and the proximity to free margins a single advancement flap was deemed most appropriate.  Using a sterile surgical marker, an appropriate advancement flap was drawn incorporating the defect and placing the expected incisions within the relaxed skin tension lines where possible.    The area thus outlined was incised deep to adipose tissue with a #15 scalpel blade.  The skin margins were undermined to an appropriate distance in all directions utilizing iris scissors.

## 2022-05-20 NOTE — ED ADULT TRIAGE NOTE - ACCOMPANIED BY
Returned pt call again to report that she indeed does have an appointment with me 6/9/2022 @ 10:00.  Mary Cisneros, Clinical Pharmacist, CDE, CACP    
Immediate family member

## 2022-08-03 ENCOUNTER — NON-APPOINTMENT (OUTPATIENT)
Age: 62
End: 2022-08-03

## 2022-08-03 ENCOUNTER — APPOINTMENT (OUTPATIENT)
Dept: CARDIOLOGY | Facility: CLINIC | Age: 62
End: 2022-08-03

## 2022-08-03 VITALS
DIASTOLIC BLOOD PRESSURE: 74 MMHG | SYSTOLIC BLOOD PRESSURE: 110 MMHG | OXYGEN SATURATION: 98 % | TEMPERATURE: 98.3 F | RESPIRATION RATE: 16 BRPM | HEART RATE: 75 BPM | WEIGHT: 188 LBS | BODY MASS INDEX: 30.34 KG/M2

## 2022-08-03 PROCEDURE — 93000 ELECTROCARDIOGRAM COMPLETE: CPT

## 2022-08-03 PROCEDURE — 99214 OFFICE O/P EST MOD 30 MIN: CPT | Mod: 25

## 2022-08-03 RX ORDER — CALCIUM CARBONATE/VITAMIN D3 500MG-5MCG
500-200 TABLET ORAL
Qty: 30 | Refills: 0 | Status: ACTIVE | COMMUNITY
Start: 2022-02-10

## 2022-08-03 RX ORDER — OMEPRAZOLE 40 MG/1
40 CAPSULE, DELAYED RELEASE ORAL
Qty: 30 | Refills: 0 | Status: ACTIVE | COMMUNITY
Start: 2022-03-11

## 2022-08-03 RX ORDER — LORATADINE 10 MG/1
10 TABLET ORAL
Qty: 30 | Refills: 0 | Status: ACTIVE | COMMUNITY
Start: 2022-07-29

## 2022-08-03 RX ORDER — COVID-19 ANTIGEN TEST
KIT MISCELLANEOUS
Qty: 2 | Refills: 0 | Status: ACTIVE | COMMUNITY
Start: 2022-07-29

## 2022-08-03 RX ORDER — LIDOCAINE AND PRILOCAINE 25; 25 MG/G; MG/G
2.5-2.5 CREAM TOPICAL
Qty: 30 | Refills: 0 | Status: ACTIVE | COMMUNITY
Start: 2022-04-02

## 2022-08-03 RX ORDER — AMMONIUM LACTATE 12 %
12 CREAM (GRAM) TOPICAL
Qty: 385 | Refills: 0 | Status: ACTIVE | COMMUNITY
Start: 2022-07-29

## 2022-08-03 RX ORDER — ALUMINUM HYDROXIDE, MAGNESIUM HYDROXIDE, DIMETHICONE 400; 400; 40 MG/5ML; MG/5ML; MG/5ML
400-400-40 SUSPENSION ORAL
Qty: 900 | Refills: 0 | Status: ACTIVE | COMMUNITY
Start: 2022-03-11

## 2022-08-03 RX ORDER — METHOCARBAMOL 750 MG/1
750 TABLET, FILM COATED ORAL
Qty: 7 | Refills: 0 | Status: ACTIVE | COMMUNITY
Start: 2022-04-02

## 2022-08-03 RX ORDER — KETOROLAC TROMETHAMINE 60 MG/2ML
60 INJECTION, SOLUTION INTRAMUSCULAR
Qty: 2 | Refills: 0 | Status: ACTIVE | COMMUNITY
Start: 2022-02-04

## 2022-08-04 NOTE — HISTORY OF PRESENT ILLNESS
[FreeTextEntry1] : 60 year HTN, Hyperlipidemia, CAD s.p PCI with ROBERTO to LAD and LCX in 2015, Normal LVEF. \par \par Clinically doing well with no chest pain , dyspnea, palpitations, syncope, or presyncope. \par \par

## 2023-02-08 ENCOUNTER — APPOINTMENT (OUTPATIENT)
Dept: CARDIOLOGY | Facility: CLINIC | Age: 63
End: 2023-02-08

## 2023-03-29 ENCOUNTER — INPATIENT (INPATIENT)
Facility: HOSPITAL | Age: 63
LOS: 5 days | Discharge: HOME | End: 2023-04-04
Attending: STUDENT IN AN ORGANIZED HEALTH CARE EDUCATION/TRAINING PROGRAM | Admitting: STUDENT IN AN ORGANIZED HEALTH CARE EDUCATION/TRAINING PROGRAM
Payer: MEDICAID

## 2023-03-29 VITALS
DIASTOLIC BLOOD PRESSURE: 87 MMHG | SYSTOLIC BLOOD PRESSURE: 152 MMHG | RESPIRATION RATE: 18 BRPM | OXYGEN SATURATION: 99 % | TEMPERATURE: 98 F | HEART RATE: 90 BPM

## 2023-03-29 DIAGNOSIS — R05.9 COUGH, UNSPECIFIED: ICD-10-CM

## 2023-03-29 DIAGNOSIS — Z29.9 ENCOUNTER FOR PROPHYLACTIC MEASURES, UNSPECIFIED: ICD-10-CM

## 2023-03-29 DIAGNOSIS — I24.9 ACUTE ISCHEMIC HEART DISEASE, UNSPECIFIED: ICD-10-CM

## 2023-03-29 DIAGNOSIS — R07.9 CHEST PAIN, UNSPECIFIED: ICD-10-CM

## 2023-03-29 DIAGNOSIS — I10 ESSENTIAL (PRIMARY) HYPERTENSION: ICD-10-CM

## 2023-03-29 DIAGNOSIS — I25.10 ATHEROSCLEROTIC HEART DISEASE OF NATIVE CORONARY ARTERY WITHOUT ANGINA PECTORIS: ICD-10-CM

## 2023-03-29 LAB
NT-PROBNP SERPL-SCNC: 72 PG/ML — SIGNIFICANT CHANGE UP
TROPONIN T, HIGH SENSITIVITY RESULT: 21 NG/L — SIGNIFICANT CHANGE UP

## 2023-03-29 PROCEDURE — 99223 1ST HOSP IP/OBS HIGH 75: CPT

## 2023-03-29 PROCEDURE — 99285 EMERGENCY DEPT VISIT HI MDM: CPT

## 2023-03-29 PROCEDURE — 99221 1ST HOSP IP/OBS SF/LOW 40: CPT | Mod: GC

## 2023-03-29 PROCEDURE — 71046 X-RAY EXAM CHEST 2 VIEWS: CPT | Mod: 26

## 2023-03-29 RX ORDER — CHOLECALCIFEROL (VITAMIN D3) 125 MCG
1 CAPSULE ORAL
Refills: 0 | DISCHARGE

## 2023-03-29 RX ORDER — METOPROLOL TARTRATE 50 MG
100 TABLET ORAL DAILY
Refills: 0 | Status: DISCONTINUED | OUTPATIENT
Start: 2023-03-29 | End: 2023-04-04

## 2023-03-29 RX ORDER — LOSARTAN/HYDROCHLOROTHIAZIDE 100MG-25MG
1 TABLET ORAL
Refills: 0 | DISCHARGE

## 2023-03-29 RX ORDER — ISOSORBIDE MONONITRATE 60 MG/1
1 TABLET, EXTENDED RELEASE ORAL
Qty: 0 | Refills: 0 | DISCHARGE

## 2023-03-29 RX ORDER — RANOLAZINE 500 MG/1
500 TABLET, FILM COATED, EXTENDED RELEASE ORAL
Refills: 0 | Status: DISCONTINUED | OUTPATIENT
Start: 2023-03-29 | End: 2023-04-04

## 2023-03-29 RX ORDER — RANOLAZINE 500 MG/1
1 TABLET, FILM COATED, EXTENDED RELEASE ORAL
Qty: 0 | Refills: 0 | DISCHARGE

## 2023-03-29 RX ORDER — ASPIRIN/CALCIUM CARB/MAGNESIUM 324 MG
81 TABLET ORAL DAILY
Refills: 0 | Status: DISCONTINUED | OUTPATIENT
Start: 2023-03-29 | End: 2023-04-04

## 2023-03-29 RX ORDER — LOSARTAN POTASSIUM 100 MG/1
100 TABLET, FILM COATED ORAL DAILY
Refills: 0 | Status: DISCONTINUED | OUTPATIENT
Start: 2023-03-29 | End: 2023-04-04

## 2023-03-29 RX ORDER — CHOLECALCIFEROL (VITAMIN D3) 125 MCG
2000 CAPSULE ORAL DAILY
Refills: 0 | Status: DISCONTINUED | OUTPATIENT
Start: 2023-03-29 | End: 2023-04-04

## 2023-03-29 RX ORDER — ATORVASTATIN CALCIUM 80 MG/1
80 TABLET, FILM COATED ORAL AT BEDTIME
Refills: 0 | Status: DISCONTINUED | OUTPATIENT
Start: 2023-03-29 | End: 2023-04-04

## 2023-03-29 RX ORDER — PRASUGREL 5 MG/1
1 TABLET, FILM COATED ORAL
Qty: 0 | Refills: 0 | DISCHARGE

## 2023-03-29 RX ORDER — METOPROLOL TARTRATE 50 MG
1.5 TABLET ORAL
Qty: 0 | Refills: 0 | DISCHARGE

## 2023-03-29 RX ORDER — METOPROLOL TARTRATE 50 MG
100 TABLET ORAL DAILY
Refills: 0 | Status: DISCONTINUED | OUTPATIENT
Start: 2023-03-29 | End: 2023-03-29

## 2023-03-29 RX ORDER — LOSARTAN POTASSIUM 100 MG/1
1 TABLET, FILM COATED ORAL
Qty: 0 | Refills: 0 | DISCHARGE

## 2023-03-29 RX ORDER — FAMOTIDINE 10 MG/ML
20 INJECTION INTRAVENOUS DAILY
Refills: 0 | Status: DISCONTINUED | OUTPATIENT
Start: 2023-03-29 | End: 2023-04-04

## 2023-03-29 RX ADMIN — ATORVASTATIN CALCIUM 80 MILLIGRAM(S): 80 TABLET, FILM COATED ORAL at 22:03

## 2023-03-29 RX ADMIN — RANOLAZINE 500 MILLIGRAM(S): 500 TABLET, FILM COATED, EXTENDED RELEASE ORAL at 19:46

## 2023-03-29 NOTE — CONSULT NOTE ADULT - ASSESSMENT
62 y/o M with PMH of HTN, HLD, CAD s/p stents x2 (2015), GERD who presents with exertional chest pain since yesterday consistent with typical angina. Low suspicion for ACS given negative troponin T and lack of ischemic changes seen on EKG    Troponin T 20 ->21. ProBNP 72  ECG: NSR, no ST changes, no TWI, no Q waves.  CXR: clear lungs  Prior TTE in 2016 with mitral annular calcification and minimal mitral regurgitaiton, grossly normal LVSF and normal RV size and function    #Angina  -continue home anti-anginal agents: Ranexa 100 mg BID and toprol 100 mg XR   -check TTE     -trend troponin T to peak  -monitor on telemetry  -will consider stress test if echo abnormal or persistent chest pain    Discussed with attending. Note not finalized until signed by attending.     Diann Lenz MD  Internal Medicine PGY2 64 y/o M with PMH of HTN, HLD, CAD s/p stents x2 (2015), GERD who presents with exertional chest pain since yesterday consistent with typical angina. Low suspicion for ACS given negative troponin T and lack of ischemic changes seen on EKG    Troponin T 20 ->21. ProBNP 72  ECG: NSR, no ST changes, no TWI, no Q waves.  CXR: clear lungs  Prior TTE in 2016 with mitral annular calcification and minimal mitral regurgitation grossly normal LVSF and normal RV size and function    #Angina  -continue home anti-anginal agents: Ranexa 500 mg BID and toprol 100 mg XL   -ASA 81 mg daily  -check TTE     -trend troponin T to peak  -monitor on telemetry  -will consider stress test if echo abnormal or persistent chest pain    Discussed with attending. Note not finalized until signed by attending.     Diann Lenz MD  Internal Medicine PGY2

## 2023-03-29 NOTE — H&P ADULT - HIV OFFER
Opt out normal... Well appearing, obese, awake, alert, oriented to person, place, time/situation and in no apparent distress. non toxic

## 2023-03-29 NOTE — H&P ADULT - NSHPSOCIALHISTORY_GEN_ALL_CORE
Patient lives at home with his daughter and son-in-law. Never smoker. No EtOH use. No recreational drugs.

## 2023-03-29 NOTE — H&P ADULT - PROBLEM SELECTOR PLAN 1
-patient with atypical CP, begins at b/l axillae, radiates to chest and R side of back, and is burning in quality  -EKG without ischemic changes, no delta on troponin  -euvolemic on exam  -f/u TTE to evaluate for WMA, monitor on telemetry  -STAT EKG and cardiac enzymes if CP recurs

## 2023-03-29 NOTE — H&P ADULT - TIME BILLING
review of laboratory data, radiology results, consultants' recommendations, documentation in [facility], discussion with patient/house staff and interdisciplinary staff (such as , social workers, etc). Interventions were performed as documented above.

## 2023-03-29 NOTE — H&P ADULT - NSICDXPASTMEDICALHX_GEN_ALL_CORE_FT
PAST MEDICAL HISTORY:  CAD (coronary artery disease)     HLD (hyperlipidemia)     HTN (hypertension)     Skin disorder Eczema on b/l LE

## 2023-03-29 NOTE — H&P ADULT - NSHPPHYSICALEXAM_GEN_ALL_CORE
Vital Signs Last 24 Hrs  T(C): 36.9 (29 Mar 2023 10:35), Max: 36.9 (29 Mar 2023 10:35)  T(F): 98.4 (29 Mar 2023 10:35), Max: 98.4 (29 Mar 2023 10:35)  HR: 90 (29 Mar 2023 10:35) (90 - 90)  BP: 152/87 (29 Mar 2023 10:35) (152/87 - 152/87)  BP(mean): --  RR: 18 (29 Mar 2023 10:35) (18 - 18)  SpO2: 99% (29 Mar 2023 10:35) (99% - 99%)    General: WN/WD NAD  Neurology: nonfocal, HERNANDEZ x 4  Eyes: PERRLA/ EOMI, Gross vision intact  ENT/Neck: Neck supple, trachea midline, No JVD, Gross hearing intact  Respiratory: Normal respiratory rate. CTA B/L, No wheezing, rales, rhonchi  CV: RRR, S1S2, no murmurs, rubs or gallops  Abdominal: Soft, NT, ND +BS  Psych: A&O x 4  Extremities: No edema, + peripheral pulses  Skin: No Rashes, Hematoma, Ecchymosis

## 2023-03-29 NOTE — PATIENT PROFILE ADULT - FALL HARM RISK - RISK INTERVENTIONS

## 2023-03-29 NOTE — H&P ADULT - HISTORY OF PRESENT ILLNESS
63 M with PMH of HTN, HLD, CAD s/p PCI to LAD and LCx in 2015, who presents with CP. Patient states he developed CP yesterday while on his way to work. He states the pain began in his axillae bilaterally and radiated to his chest and R side of his back. He describes the pain as "burning" or "hot." He says the pain improved when he stopped walking. Not associated with SOB, diaphoresis, or palpitations. Pt denies orthopnea. Additionally, pt states that he feels he contracted a "cold" 2 days ago. He is having a cough that is sometimes productive of phlegm; he also believes voice is hoarse. No fevers, chills, N/V/D,dysuria, hematuria, rashes, dizziness, or lightheadedness. Pt currently denies any further CP.

## 2023-03-29 NOTE — H&P ADULT - NSHPLABSRESULTS_GEN_ALL_CORE
.                        15.3   10.00 )-----------( 162      ( 29 Mar 2023 12:36 )             46.6     Hgb Trend: 15.3<--  03-29    136  |  101  |  18  ----------------------------<  110<H>  4.9   |  24  |  1.17    Ca    9.0      29 Mar 2023 12:36    TPro  6.9  /  Alb  4.1  /  TBili  0.5  /  DBili  x   /  AST  27  /  ALT  22  /  AlkPhos  68  03-29    Creatinine Trend: 1.17<--  PT/INR - ( 29 Mar 2023 12:36 )   PT: 12.2 sec;   INR: 1.05 ratio         Troponin 20 --->21    EKG: NSR with 1st degree AV block. My read.

## 2023-03-29 NOTE — ED ADULT TRIAGE NOTE - CHIEF COMPLAINT QUOTE
downtime triage:  chest pain for 2 days.  mild SOB as well  PMH- HTN, High cholesterol, Cardiac stents x 3

## 2023-03-29 NOTE — CONSULT NOTE ADULT - SUBJECTIVE AND OBJECTIVE BOX
CHIEF COMPLAINT: Chest Pain    HISTORY OF PRESENT ILLNESS:   64 y/o M with PMH of HTN, HLD, CAD s/p stents, GERD,       PAST MEDICAL & SURGICAL HISTORY:  HLD (hyperlipidemia)      HTN (hypertension)      Skin disorder  Eczema on b/l LE      CAD (coronary artery disease)      No significant past surgical history      FAMILY HISTORY:  [ ] no pertinent family history of premature cardiovascular disease in first degree relatives.  Mother:   Father:   Siblings:     SOCIAL HISTORY:    [ ] Non-smoker  [ ] Smoker  [ ] Alcohol    Allergies    No Known Allergies    Intolerances    	    REVIEW OF SYSTEMS:    CONSTITUTIONAL: No weakness, fevers or chills  EYES: No visual changes; no sclera icterics, no pain or drainage  ENT:  No vertigo or throat pain   NECK: No pain or stiffness  RESPIRATORY: No cough, wheezing, hemoptysis; No shortness of breath  CARDIOVASCULAR: No chest pain or palpitations  GASTROINTESTINAL: No abdominal or epigastric pain. No nausea, vomiting, or hematemesis; No diarrhea or constipation. No melena or hematochezia.  GENITOURINARY: No dysuria, frequency or hematuria  NEUROLOGICAL: No numbness or weakness. No headache  SKIN: No itching, rashes  Psych: No anxiety or depression      PHYSICAL EXAM:  T(C): --  HR: --  BP: --  RR: --  SpO2: --  Wt(kg): --  I&O's Summary      General Appearance: well appearing, normal for age and gender. 	  Neck: normal JVP, no bruit.   Eyes: PERRLA, Extra Ocular muscles intact.   Cardiovascular: regular rate and rhythm S1 S2, No JVD, No murmurs, No edema  Respiratory: Lungs clear to auscultation	  Psychiatry: Alert and oriented x 3, Mood & affect appropriate  Gastrointestinal:  Soft, Non-tender  Skin/Integumen: No rashes, No ecchymoses, No cyanosis	  Neurologic: Non-focal  Musculoskeletal/ extremities: Normal range of motion, No clubbing, cyanosis or edema  Vascular: Peripheral pulses palpable 2+ bilaterally    LABS:	 	                          15.3   10.00 )-----------( 162      ( 29 Mar 2023 12:36 )             46.6     03-29    136  |  101  |  18  ----------------------------<  110<H>  4.9   |  24  |  1.17    Ca    9.0      29 Mar 2023 12:36    TPro  6.9  /  Alb  4.1  /  TBili  0.5  /  DBili  x   /  AST  27  /  ALT  22  /  AlkPhos  68  03-29        PT/INR - ( 29 Mar 2023 12:36 )   PT: 12.2 sec;   INR: 1.05 ratio             CARDIAC MARKERS:            TELEMETRY EVENTS: 	    ECG:  	  RADIOLOGY:  OTHER: 	    PREVIOUS DIAGNOSTIC TESTING:    [ ] Echocardiogram:  [ ]  Catheterization:  [ ] Stress Test:  	  	    Home Medications:  aspirin 81 mg oral delayed release tablet: 1 tab(s) orally once a day (10 Sep 2015 00:39)  atorvastatin 80 mg oral tablet: 1 tab(s) orally once a day (25 Jul 2017 03:13)  Effient 10 mg oral tablet: 1 tab(s) orally once a day (25 Jul 2017 03:14)  famotidine 20 mg oral tablet: 1 tab(s) orally once a day (25 Jul 2017 03:13)  isosorbide mononitrate 60 mg oral tablet, extended release: 1 tab(s) orally once a day (in the morning) (25 Jul 2017 03:15)  losartan 100 mg oral tablet: 1 tab(s) orally once a day (25 Jul 2017 03:14)  metoprolol succinate 100 mg oral tablet, extended release: 1.5 tab(s) orally once a day (25 Jul 2017 03:14)  Ranexa 500 mg oral tablet, extended release: 1 tab(s) orally 2 times a day (25 Jul 2017 03:14)    MEDICATIONS  (STANDING):    MEDICATIONS  (PRN):         CHIEF COMPLAINT: Chest Pain    HISTORY OF PRESENT ILLNESS:   62 y/o M with PMH of HTN, HLD, CAD s/p stents x2 (2015), GERD who presents with chest pain. Patient reports chest pain started yesterday morning while patient was on his way to work. States chest pain occurs intermittently and with exertion and resolved with rest. He has had 2 episodes of chest pain since pain began yesterday. Patient describes chest pain as a burning sensation that is located over lateral chest wall and axillary region bilaterally and radiates towards the center of his chest and back. Patient states pain is similar to chest pain he experienced prior to stent placement in 2015. He denies diaphoresis, SOB at rest or with exertion, arm pain, jaw pain, nausea, vomiting, LE edema, orthopnea. Patient follows with cardiologist Dr. Washington. Pt states he has been chest pain-free since he had stents placed in . States he is adherent to aspirin and medications at home.     Pt endorses had cough, rhinorrhea, and chills for the past 2 days.     In ED, vital signs within normal limits. Labs showed troponin T 20, proBNP 72, no leukocytosis. Chest XR shows clear lungs. ECG: NSR, no ST changes, no TWI, no Q waves.    PAST MEDICAL & SURGICAL HISTORY:  HLD (hyperlipidemia)      HTN (hypertension)      Skin disorder  Eczema on b/l LE      CAD (coronary artery disease)      No significant past surgical history      FAMILY HISTORY:  [ ] no pertinent family history of premature cardiovascular disease in first degree relatives.  Mother:   Father:   Siblings: Brother  of MI at age 45    SOCIAL HISTORY:    [X] Non-smoker  [ ] Smoker  [ ] Alcohol    Allergies    No Known Allergies    Intolerances    	    REVIEW OF SYSTEMS:  CONSTITUTIONAL: No weakness, fevers or chills  EYES: No visual changes; no sclera icterics, no pain or drainage  ENT:  No vertigo or throat pain   NECK: No pain or stiffness  RESPIRATORY: No cough, wheezing, hemoptysis; No shortness of breath  CARDIOVASCULAR: No chest pain or palpitations  GASTROINTESTINAL: No abdominal or epigastric pain. No nausea, vomiting, or hematemesis; No diarrhea or constipation. No melena or hematochezia.  GENITOURINARY: No dysuria, frequency or hematuria  NEUROLOGICAL: No numbness or weakness. No headache  SKIN: No itching, rashes  Psych: No anxiety or depression      PHYSICAL EXAM:  ICU Vital Signs Last 24 Hrs  T(C): 36.9 (29 Mar 2023 10:35), Max: 36.9 (29 Mar 2023 10:35)  T(F): 98.4 (29 Mar 2023 10:35), Max: 98.4 (29 Mar 2023 10:35)  HR: 90 (29 Mar 2023 10:35) (90 - 90)  BP: 152/87 (29 Mar 2023 10:35) (152/87 - 152/87)  BP(mean): --  ABP: --  ABP(mean): --  RR: 18 (29 Mar 2023 10:35) (18 - 18)  SpO2: 99% (29 Mar 2023 10:35) (99% - 99%)      General Appearance: NAD	  Neck: no JVD  Cardiovascular: RRR, normal S1 and S2, no murmurs, rubs, or gallops. No LE edema  Respiratory: Bibasilar crackles. Mild wheezing. No rhonchi  Gastrointestinal: nondistended, soft, no epigastric tenderness  Neurologic: Non-focal deficits      LABS:	 	                          15.3   10.00 )-----------( 162      ( 29 Mar 2023 12:36 )             46.6     03-    136  |  101  |  18  ----------------------------<  110<H>  4.9   |  24  |  1.17    Ca    9.0      29 Mar 2023 12:36    TPro  6.9  /  Alb  4.1  /  TBili  0.5  /  DBili  x   /  AST  27  /  ALT  22  /  AlkPhos  68  29        PT/INR - ( 29 Mar 2023 12:36 )   PT: 12.2 sec;   INR: 1.05 ratio             CARDIAC MARKERS:               ECG: NSR, no ST changes, no TWI, no Q waves.      RADIOLOGY:    EXAM:  XR CHEST PA LAT 2V   ORDERED BY: DEONDRE EPSTEIN     PROCEDURE DATE:  2023          INTERPRETATION:  CLINICAL INFORMATION: Chest pain    TIME OF EXAMINATION: 2023 at 11:58 AM    EXAM: PA and Lateral Chest    FINDINGS:  Examination in frontal and lateral projection fails to show   evidence of any active pulmonary disease.  The heart is not enlarged and   there is no pleural effusion or pneumothorax.    There is no acute bone pathology.        COMPARISON: 2017        IMPRESSION: Clear lungs.          OTHER: 	    PREVIOUS DIAGNOSTIC TESTING:    [X] Echocardiogram: TTE 2016 mitral annular calcification, minimal mitral regurgitation; grossly normal LV systolic function, normal RV size and function  [x] Catheterization:  2015 proximal LAD 80% stenosis with stent placement, CX OM3 100 % stenosis, this lesion is a chronic total occlusion.    	    Home Medications:  aspirin 81 mg oral delayed release tablet: 1 tab(s) orally once a day (10 Sep 2015 00:39)  atorvastatin 80 mg oral tablet: 1 tab(s) orally once a day (2017 03:13)  Effient 10 mg oral tablet: 1 tab(s) orally once a day (2017 03:14)  famotidine 20 mg oral tablet: 1 tab(s) orally once a day (2017 03:13)  isosorbide mononitrate 60 mg oral tablet, extended release: 1 tab(s) orally once a day (in the morning) (2017 03:15)  losartan 100 mg oral tablet: 1 tab(s) orally once a day (2017 03:14)  metoprolol succinate 100 mg oral tablet, extended release: 1.5 tab(s) orally once a day (2017 03:14)  Ranexa 500 mg oral tablet, extended release: 1 tab(s) orally 2 times a day (2017 03:14)    MEDICATIONS  (STANDING):    MEDICATIONS  (PRN):

## 2023-03-29 NOTE — CONSULT NOTE ADULT - ATTENDING COMMENTS
The patient was seen and examined with the Cardiology Consultation Teaching Service.   [facility] telephone  used    The patient is a 63-year-old man with coronary artery disease and prior PCI who presented with two episodes of typical chest pain similar to his prior angina.     No dyspnea, orthopnea or PND  No palpitations or dizziness    Reports good medication adherence  Has been chest pain free since his PCI in 2015    PMH/PSH:  Coronary artery disease  Percutaneous coronary intervention  Hypertension  Dyslipidemia  GERD    Comfortable-appearing man in no acute distress  Alert and oriented  Afebrile  Vital signs stable  JVP is not elevated  Clear lungs  Normal heart sounds  Extremities are warm and perfused  No peripheral edema     Normal blood counts  GFR normal     hs-troponin 21, 20  pro-BNP 72    ECG demonstrates sinus rhythm without ST-segment deviation  CXR without pulmonary congestion    Echocardiography in 2016 demonstrated normal LV function    Impression and Recommendations:   63-year-old man with coronary artery disease and prior PCI who presented with two episodes of typical chest pain similar to his prior angina.     The patient's clinical story is concerning for angina, however, his unremarkable cardiac biomarkers and ECG are reassuring and do not suggest that the patient is presenting with acute coronary syndrome.     Please obtain echocardiography and monitor the patient on telemetry. Will reassess if there is recurrent of chest pain or further changes on ECG or cardiac markers. If the patient has recurrent symptoms, I would be more likely to pursue coronary angiography. If the patient remains asymptomatic and is ambulatory, he may more appropriately be risk stratified using non-invasive methods in the setting of normal biomarkers and ECG.     Continue aspirin, prasugrel and atorvastatin for known coronary disease.  Continue the patient's antianginal therapy with beta-blocker, long-acting nitrates, and ranolazine.   Continue losartan for hypertension.     Seth Nieto MD  Cardiology  [phone]

## 2023-03-30 DIAGNOSIS — N28.9 DISORDER OF KIDNEY AND URETER, UNSPECIFIED: ICD-10-CM

## 2023-03-30 DIAGNOSIS — N17.9 ACUTE KIDNEY FAILURE, UNSPECIFIED: ICD-10-CM

## 2023-03-30 LAB
ANION GAP SERPL CALC-SCNC: 14 MMOL/L — SIGNIFICANT CHANGE UP (ref 7–14)
B PERT DNA SPEC QL NAA+PROBE: SIGNIFICANT CHANGE UP
B PERT+PARAPERT DNA PNL SPEC NAA+PROBE: SIGNIFICANT CHANGE UP
BORDETELLA PARAPERTUSSIS (RAPRVP): SIGNIFICANT CHANGE UP
BUN SERPL-MCNC: 22 MG/DL — SIGNIFICANT CHANGE UP (ref 7–23)
C PNEUM DNA SPEC QL NAA+PROBE: SIGNIFICANT CHANGE UP
CALCIUM SERPL-MCNC: 8.7 MG/DL — SIGNIFICANT CHANGE UP (ref 8.4–10.5)
CHLORIDE SERPL-SCNC: 101 MMOL/L — SIGNIFICANT CHANGE UP (ref 98–107)
CO2 SERPL-SCNC: 23 MMOL/L — SIGNIFICANT CHANGE UP (ref 22–31)
CREAT SERPL-MCNC: 1.35 MG/DL — HIGH (ref 0.5–1.3)
EGFR: 59 ML/MIN/1.73M2 — LOW
FLUAV SUBTYP SPEC NAA+PROBE: SIGNIFICANT CHANGE UP
FLUBV RNA SPEC QL NAA+PROBE: SIGNIFICANT CHANGE UP
GLUCOSE SERPL-MCNC: 98 MG/DL — SIGNIFICANT CHANGE UP (ref 70–99)
HADV DNA SPEC QL NAA+PROBE: SIGNIFICANT CHANGE UP
HCOV 229E RNA SPEC QL NAA+PROBE: SIGNIFICANT CHANGE UP
HCOV HKU1 RNA SPEC QL NAA+PROBE: SIGNIFICANT CHANGE UP
HCOV NL63 RNA SPEC QL NAA+PROBE: SIGNIFICANT CHANGE UP
HCOV OC43 RNA SPEC QL NAA+PROBE: SIGNIFICANT CHANGE UP
HCT VFR BLD CALC: 45.2 % — SIGNIFICANT CHANGE UP (ref 39–50)
HCV AB S/CO SERPL IA: 0.13 S/CO — SIGNIFICANT CHANGE UP (ref 0–0.99)
HCV AB SERPL-IMP: SIGNIFICANT CHANGE UP
HGB BLD-MCNC: 14.8 G/DL — SIGNIFICANT CHANGE UP (ref 13–17)
HMPV RNA SPEC QL NAA+PROBE: SIGNIFICANT CHANGE UP
HPIV1 RNA SPEC QL NAA+PROBE: SIGNIFICANT CHANGE UP
HPIV2 RNA SPEC QL NAA+PROBE: SIGNIFICANT CHANGE UP
HPIV3 RNA SPEC QL NAA+PROBE: SIGNIFICANT CHANGE UP
HPIV4 RNA SPEC QL NAA+PROBE: SIGNIFICANT CHANGE UP
M PNEUMO DNA SPEC QL NAA+PROBE: SIGNIFICANT CHANGE UP
MAGNESIUM SERPL-MCNC: 1.9 MG/DL — SIGNIFICANT CHANGE UP (ref 1.6–2.6)
MCHC RBC-ENTMCNC: 30.3 PG — SIGNIFICANT CHANGE UP (ref 27–34)
MCHC RBC-ENTMCNC: 32.7 GM/DL — SIGNIFICANT CHANGE UP (ref 32–36)
MCV RBC AUTO: 92.4 FL — SIGNIFICANT CHANGE UP (ref 80–100)
NRBC # BLD: 0 /100 WBCS — SIGNIFICANT CHANGE UP (ref 0–0)
NRBC # FLD: 0 K/UL — SIGNIFICANT CHANGE UP (ref 0–0)
PHOSPHATE SERPL-MCNC: 3.2 MG/DL — SIGNIFICANT CHANGE UP (ref 2.5–4.5)
PLATELET # BLD AUTO: 164 K/UL — SIGNIFICANT CHANGE UP (ref 150–400)
POTASSIUM SERPL-MCNC: 4.2 MMOL/L — SIGNIFICANT CHANGE UP (ref 3.5–5.3)
POTASSIUM SERPL-SCNC: 4.2 MMOL/L — SIGNIFICANT CHANGE UP (ref 3.5–5.3)
RAPID RVP RESULT: DETECTED
RBC # BLD: 4.89 M/UL — SIGNIFICANT CHANGE UP (ref 4.2–5.8)
RBC # FLD: 13.2 % — SIGNIFICANT CHANGE UP (ref 10.3–14.5)
RSV RNA SPEC QL NAA+PROBE: SIGNIFICANT CHANGE UP
RV+EV RNA SPEC QL NAA+PROBE: DETECTED
SARS-COV-2 RNA SPEC QL NAA+PROBE: SIGNIFICANT CHANGE UP
SODIUM SERPL-SCNC: 138 MMOL/L — SIGNIFICANT CHANGE UP (ref 135–145)
WBC # BLD: 9.25 K/UL — SIGNIFICANT CHANGE UP (ref 3.8–10.5)
WBC # FLD AUTO: 9.25 K/UL — SIGNIFICANT CHANGE UP (ref 3.8–10.5)

## 2023-03-30 PROCEDURE — 99233 SBSQ HOSP IP/OBS HIGH 50: CPT

## 2023-03-30 PROCEDURE — 93306 TTE W/DOPPLER COMPLETE: CPT | Mod: 26

## 2023-03-30 PROCEDURE — 99232 SBSQ HOSP IP/OBS MODERATE 35: CPT | Mod: GC

## 2023-03-30 RX ORDER — SODIUM CHLORIDE 9 MG/ML
1000 INJECTION INTRAMUSCULAR; INTRAVENOUS; SUBCUTANEOUS
Refills: 0 | Status: DISCONTINUED | OUTPATIENT
Start: 2023-03-30 | End: 2023-04-01

## 2023-03-30 RX ADMIN — LOSARTAN POTASSIUM 100 MILLIGRAM(S): 100 TABLET, FILM COATED ORAL at 05:54

## 2023-03-30 RX ADMIN — Medication 2000 UNIT(S): at 13:15

## 2023-03-30 RX ADMIN — ATORVASTATIN CALCIUM 80 MILLIGRAM(S): 80 TABLET, FILM COATED ORAL at 22:28

## 2023-03-30 RX ADMIN — Medication 81 MILLIGRAM(S): at 13:14

## 2023-03-30 RX ADMIN — RANOLAZINE 500 MILLIGRAM(S): 500 TABLET, FILM COATED, EXTENDED RELEASE ORAL at 21:22

## 2023-03-30 RX ADMIN — RANOLAZINE 500 MILLIGRAM(S): 500 TABLET, FILM COATED, EXTENDED RELEASE ORAL at 05:54

## 2023-03-30 RX ADMIN — Medication 100 MILLIGRAM(S): at 05:54

## 2023-03-30 RX ADMIN — SODIUM CHLORIDE 75 MILLILITER(S): 9 INJECTION INTRAMUSCULAR; INTRAVENOUS; SUBCUTANEOUS at 13:15

## 2023-03-30 NOTE — CONSULT NOTE ADULT - ASSESSMENT
64 y/o M with PMH of HTN, HLD, CAD s/p stents x2 (2015), eczema, GERD who presents with 2 episodes of exertional chest pain concerning for angina. Low suspicion for ACS given negative troponin T and lack of ischemic changes seen on EKG    Troponin T 20 ->21. ProBNP 72  ECG: NSR, no ST changes, no TWI, no Q waves.  CXR: clear lungs  TTE: EF 62%, normal LVSF, no segmental wall motion abnormalities, normal RV size and function    #Angina  -continue home anti-anginal agents: Ranexa 500 mg BID and toprol 100 mg XL   -ASA 81 mg daily and atorvastatin 80 mg daily  -monitor on telemetry  -recommend nuclear stress test given history of CAD and new exertional chest pain suggestive of angina    Discussed with attending. Note not finalized until signed by attending.     Diann Lenz MD  Internal Medicine PGY2   64 y/o M with PMH of HTN, HLD, CAD s/p stents x2 (2015), eczema, GERD who presents with 2 episodes of exertional chest pain concerning for angina. Low suspicion for ACS given negative troponin T and lack of ischemic changes seen on EKG    Troponin T 20 ->21. ProBNP 72  ECG: NSR, no ST changes, no TWI, no Q waves.  CXR: clear lungs  TTE: EF 62%, normal LVSF, no segmental wall motion abnormalities, normal RV size and function    #Angina  -TEODORA Sc : 4; HEART Sc: 4 points, moderate  -continue home anti-anginal agents: Ranexa 500 mg BID and toprol 100 mg XL   -ASA 81 mg daily and atorvastatin 80 mg daily  -monitor on telemetry  -recommend nuclear stress test given history of CAD and new exertional chest pain suggestive of angina    Discussed with attending. Note not finalized until signed by attending.     Diann Lenz MD  Internal Medicine PGY2

## 2023-03-30 NOTE — CONSULT NOTE ADULT - ATTENDING COMMENTS
The patient was seen and examined with the Cardiology Consultation Teaching Service.     No overnight events    No chest pain  No dyspnea, orthopnea or PND  No palpitations or dizziness     Patient is ambulating in the hallway without recurrent symptoms    PMH/PSH:  Coronary artery disease  Percutaneous coronary intervention  Hypertension  Dyslipidemia  GERD    Comfortable-appearing man in no acute distress  Alert and oriented  Afebrile  Vital signs stable  JVP is not elevated  Clear lungs  Normal heart sounds  Extremities are warm and perfused  No peripheral edema     Normal blood counts  GFR 59    hs-troponin 21, 20  pro-BNP 72    Echocardiography demonstrates normal LV size and systolic function, mitral annular calcification with minimal MR    Impression and Recommendations:   63-year-old man with coronary artery disease and prior PCI who presented with two episodes of typical chest pain similar to his prior angina.     The patient's clinical story is concerning for angina, however, his unremarkable cardiac biomarkers and ECG are reassuring and do not suggest that the patient is presenting with acute coronary syndrome.     Plan for non-invasive assessment with exercise stress testing with myocardial perfusing imaging.    Continue aspirin, prasugrel and atorvastatin for known coronary disease.  Continue the patient's antianginal therapy with beta-blocker, long-acting nitrates, and ranolazine.   Continue losartan for hypertension.     Seth Nieto MD  Cardiology  [phone]

## 2023-03-30 NOTE — ED PROVIDER NOTE - CARE PLAN
Principal Discharge DX:	Chest pain  Secondary Diagnosis:	Enterovirus infection  Secondary Diagnosis:	BERNA (acute kidney injury)   1

## 2023-03-30 NOTE — CONSULT NOTE ADULT - SUBJECTIVE AND OBJECTIVE BOX
Authored by Dr. Diann Lenz MD (PGY-2).    Patient is a 63y old  Male who presents with a chief complaint of Chest pain (29 Mar 2023 17:51)      SUBJECTIVE / OVERNIGHT EVENTS:  No acute events overnight.     tele: NSR, no ectopy    Pt denies chest pain, SOB, nausea, vomiting. Last episode of chest pain yesterday. Reports he has not gotten OOB to ambulate    MEDICATIONS  (STANDING):  aspirin enteric coated 81 milliGRAM(s) Oral daily  atorvastatin 80 milliGRAM(s) Oral at bedtime  cholecalciferol 2000 Unit(s) Oral daily  famotidine    Tablet 20 milliGRAM(s) Oral daily  losartan 100 milliGRAM(s) Oral daily  metoprolol succinate  milliGRAM(s) Oral daily  ranolazine 500 milliGRAM(s) Oral two times a day  sodium chloride 0.9%. 1000 milliLiter(s) (75 mL/Hr) IV Continuous <Continuous>    MEDICATIONS  (PRN):      CAPILLARY BLOOD GLUCOSE        I&O's Summary    29 Mar 2023 07:01  -  30 Mar 2023 07:00  --------------------------------------------------------  IN: 200 mL / OUT: 0 mL / NET: 200 mL        PHYSICAL EXAM:  Vital Signs Last 24 Hrs  T(C): 36.6 (30 Mar 2023 11:16), Max: 36.7 (29 Mar 2023 18:37)  T(F): 97.8 (30 Mar 2023 11:16), Max: 98 (29 Mar 2023 18:37)  HR: 82 (30 Mar 2023 11:16) (72 - 82)  BP: 140/91 (30 Mar 2023 11:16) (139/72 - 142/75)  BP(mean): --  RR: 18 (30 Mar 2023 11:16) (16 - 18)  SpO2: 100% (30 Mar 2023 11:16) (100% - 100%)    Parameters below as of 30 Mar 2023 11:16  Patient On (Oxygen Delivery Method): room air    General Appearance: NAD	  Neck: no JVD  Cardiovascular: RRR, normal S1 and S2, no murmurs, rubs, or gallops. No LE edema  Respiratory: mild bibasilar crackles, no rhonchi  Gastrointestinal: nondistended, soft, no epigastric tenderness  Neurologic: no focal deficits      LABS:                        14.8   9.25  )-----------( 164      ( 30 Mar 2023 06:00 )             45.2     03-30    138  |  101  |  22  ----------------------------<  98  4.2   |  23  |  1.35<H>    Ca    8.7      30 Mar 2023 06:00  Phos  3.2     03-30  Mg     1.90     03-30    TPro  6.9  /  Alb  4.1  /  TBili  0.5  /  DBili  x   /  AST  27  /  ALT  22  /  AlkPhos  68  03-29    PT/INR - ( 29 Mar 2023 12:36 )   PT: 12.2 sec;   INR: 1.05 ratio                         RADIOLOGY & ADDITIONAL TESTS:  Results Reviewed: X  Imaging Personally Reviewed:  Electrocardiogram Personally Reviewed:       Transthoracic echocardiogram with 2-D, M-Mode  and complete spectral and color flow Doppler.  INDICATION: Abnormal electrocardiogram (ECG) (EKG) (R94.31)  ------------------------------------------------------------------------  DIMENSIONS:  Dimensions:     Normal Values:  LA:     3.0 cm    2.0 - 4.0 cm  Ao:     3.3 cm    2.0 - 3.8 cm  SEPTUM: 0.8 cm    0.6 - 1.2 cm  PWT:    0.8 cm    0.6 - 1.1 cm  LVIDd:  5.2 cm    3.0 - 5.6 cm  LVIDs:  3.5 cm    1.8 - 4.0 cm  Derived Variables:  LVMI: 72 g/m2  RWT: 0.30  Fractional short: 33 %  Ejection Fraction (Modified Chen Rule): 62 %  ------------------------------------------------------------------------  OBSERVATIONS:  Mitral Valve: Mitral annular calcification, otherwise  normal mitral valve. Minimal mitral regurgitation.  Aortic Root: Normal size aortic root. (Ao:3.3 cm).  Aortic Valve: Calcified trileaflet aortic valve with normal  opening. Minimal aortic regurgitation.  Left Atrium: Normal left atrium.  LA volume index = 17  cc/m2.  Left Ventricle: Normal left ventricular systolic function.  No segmental wall motion abnormalities. Normal left  ventricular internal dimensions and wall thicknesses.  Right Heart: Normal right atrium. Normal right ventricular  size and function. Normal tricuspid valve.  Minimal  tricuspid regurgitation. Normal pulmonic valve.  Pericardium/PleuraNormal pericardium with no pericardial  effusion.  ------------------------------------------------------------------------  CONCLUSIONS:  1. Mitral annular calcification, otherwise normal mitral  valve. Minimal mitral regurgitation.  2. Normal left ventricular internal dimensions and wall  thicknesses.  3. Normal left ventricular systolic function. No segmental  wall motion abnormalities.  4. Normal right ventricular size and function.

## 2023-03-30 NOTE — ED PROVIDER NOTE - OBJECTIVE STATEMENT
LEODAN Lassiter: I was not involved in the care of this patient and am only entering information to back log for downtime that took place starting 3/24/23 at 2300 and carrying through 03/30/23

## 2023-03-31 ENCOUNTER — TRANSCRIPTION ENCOUNTER (OUTPATIENT)
Age: 63
End: 2023-03-31

## 2023-03-31 LAB
ANION GAP SERPL CALC-SCNC: 12 MMOL/L — SIGNIFICANT CHANGE UP (ref 7–14)
BUN SERPL-MCNC: 15 MG/DL — SIGNIFICANT CHANGE UP (ref 7–23)
CALCIUM SERPL-MCNC: 8.6 MG/DL — SIGNIFICANT CHANGE UP (ref 8.4–10.5)
CHLORIDE SERPL-SCNC: 101 MMOL/L — SIGNIFICANT CHANGE UP (ref 98–107)
CO2 SERPL-SCNC: 21 MMOL/L — LOW (ref 22–31)
CREAT SERPL-MCNC: 1.1 MG/DL — SIGNIFICANT CHANGE UP (ref 0.5–1.3)
EGFR: 75 ML/MIN/1.73M2 — SIGNIFICANT CHANGE UP
GLUCOSE SERPL-MCNC: 104 MG/DL — HIGH (ref 70–99)
HCT VFR BLD CALC: 44.5 % — SIGNIFICANT CHANGE UP (ref 39–50)
HGB BLD-MCNC: 14.7 G/DL — SIGNIFICANT CHANGE UP (ref 13–17)
MAGNESIUM SERPL-MCNC: 1.8 MG/DL — SIGNIFICANT CHANGE UP (ref 1.6–2.6)
MCHC RBC-ENTMCNC: 30.1 PG — SIGNIFICANT CHANGE UP (ref 27–34)
MCHC RBC-ENTMCNC: 33 GM/DL — SIGNIFICANT CHANGE UP (ref 32–36)
MCV RBC AUTO: 91.2 FL — SIGNIFICANT CHANGE UP (ref 80–100)
NRBC # BLD: 0 /100 WBCS — SIGNIFICANT CHANGE UP (ref 0–0)
NRBC # FLD: 0 K/UL — SIGNIFICANT CHANGE UP (ref 0–0)
PHOSPHATE SERPL-MCNC: 2.6 MG/DL — SIGNIFICANT CHANGE UP (ref 2.5–4.5)
PLATELET # BLD AUTO: 164 K/UL — SIGNIFICANT CHANGE UP (ref 150–400)
POTASSIUM SERPL-MCNC: 3.8 MMOL/L — SIGNIFICANT CHANGE UP (ref 3.5–5.3)
POTASSIUM SERPL-SCNC: 3.8 MMOL/L — SIGNIFICANT CHANGE UP (ref 3.5–5.3)
RBC # BLD: 4.88 M/UL — SIGNIFICANT CHANGE UP (ref 4.2–5.8)
RBC # FLD: 13 % — SIGNIFICANT CHANGE UP (ref 10.3–14.5)
SODIUM SERPL-SCNC: 134 MMOL/L — LOW (ref 135–145)
WBC # BLD: 10.67 K/UL — HIGH (ref 3.8–10.5)
WBC # FLD AUTO: 10.67 K/UL — HIGH (ref 3.8–10.5)

## 2023-03-31 PROCEDURE — 93018 CV STRESS TEST I&R ONLY: CPT | Mod: GC

## 2023-03-31 PROCEDURE — 93016 CV STRESS TEST SUPVJ ONLY: CPT | Mod: GC

## 2023-03-31 PROCEDURE — 99233 SBSQ HOSP IP/OBS HIGH 50: CPT

## 2023-03-31 PROCEDURE — 78452 HT MUSCLE IMAGE SPECT MULT: CPT | Mod: 26

## 2023-03-31 RX ADMIN — Medication 2000 UNIT(S): at 13:55

## 2023-03-31 RX ADMIN — Medication 100 MILLIGRAM(S): at 05:36

## 2023-03-31 RX ADMIN — RANOLAZINE 500 MILLIGRAM(S): 500 TABLET, FILM COATED, EXTENDED RELEASE ORAL at 18:23

## 2023-03-31 RX ADMIN — FAMOTIDINE 20 MILLIGRAM(S): 10 INJECTION INTRAVENOUS at 13:55

## 2023-03-31 RX ADMIN — ATORVASTATIN CALCIUM 80 MILLIGRAM(S): 80 TABLET, FILM COATED ORAL at 21:31

## 2023-03-31 RX ADMIN — RANOLAZINE 500 MILLIGRAM(S): 500 TABLET, FILM COATED, EXTENDED RELEASE ORAL at 05:36

## 2023-03-31 RX ADMIN — Medication 81 MILLIGRAM(S): at 13:55

## 2023-03-31 RX ADMIN — LOSARTAN POTASSIUM 100 MILLIGRAM(S): 100 TABLET, FILM COATED ORAL at 05:36

## 2023-03-31 NOTE — PHARMACOTHERAPY INTERVENTION NOTE - COMMENTS
Current medications reviewed with the patient via [facility]  #[id number]. Current medication schedule was discussed in detail including: medication name, indication, dose, administration times, treatment duration, side effects, drug interactions, and special instructions. Patient questions and concerns were answered and addressed. Patient demonstrated understanding.     Joy Leonard, PharmD, [facility]  Clinical Pharmacy Specialist  [phone]

## 2023-03-31 NOTE — DISCHARGE NOTE PROVIDER - NSDCCPCAREPLAN_GEN_ALL_CORE_FT
PRINCIPAL DISCHARGE DIAGNOSIS  Diagnosis: Chest pain  Assessment and Plan of Treatment: You came to the hospital with chest pain and were seen by cardiology. You had an echocardiogram done which was not concerning. You underwent a nuclear stress test of the heart. If you have worsening or constant chest pain, please return to the hospital.      SECONDARY DISCHARGE DIAGNOSES  Diagnosis: Enterovirus infection  Assessment and Plan of Treatment: You had a viral infection and were treated with IV fluids. If you feel like you have worsening fevers, chills, sore throat, or other signs of worsening infection, please see your primary doctor or return to the hospital.     PRINCIPAL DISCHARGE DIAGNOSIS  Diagnosis: Chest pain  Assessment and Plan of Treatment: You came to the hospital with chest pain and were seen by cardiology. You had an echocardiogram done which was not concerning. You underwent a nuclear stress test of the heart which was abnormal.  You had a cardiac catheterization and had two stents placed. Please continue taking aspirin and plavix as prescribed.  If you have worsening or constant chest pain, please return to the hospital.      SECONDARY DISCHARGE DIAGNOSES  Diagnosis: Enterovirus infection  Assessment and Plan of Treatment: You had a viral infection and were treated with IV fluids. If you feel like you have worsening fevers, chills, sore throat, or other signs of worsening infection, please see your primary doctor or return to the hospital.    Diagnosis: BERNA (acute kidney injury)  Assessment and Plan of Treatment: You bloodwork showed dehydration on admission, which resolved with IV fluids.

## 2023-03-31 NOTE — DISCHARGE NOTE PROVIDER - ATTENDING DISCHARGE PHYSICAL EXAMINATION:
PHYSICAL EXAM:  Vital Signs Last 24 Hrs  T(C): 36.8 (02 Apr 2023 04:43), Max: 36.8 (01 Apr 2023 11:38)  T(F): 98.2 (02 Apr 2023 04:43), Max: 98.2 (01 Apr 2023 11:38)  HR: 75 (02 Apr 2023 04:43) (75 - 109)  BP: 130/75 (02 Apr 2023 04:43) (121/74 - 130/75)  BP(mean): --  RR: 18 (02 Apr 2023 04:43) (17 - 18)  SpO2: 100% (02 Apr 2023 04:43) (99% - 100%)    Parameters below as of 02 Apr 2023 04:43  Patient On (Oxygen Delivery Method): room air      CONSTITUTIONAL: NAD, well-developed, well-groomed  EYES: PERRLA; conjunctiva and sclera clear  ENMT: Moist oral mucosa, no pharyngeal injection or exudates; normal dentition  NECK: Supple, no palpable masses; no thyromegaly  RESPIRATORY: Normal respiratory effort; lungs are clear to auscultation bilaterally  CARDIOVASCULAR: Regular rate and rhythm, normal S1 and S2, no murmur/rub/gallop; No lower extremity edema; Peripheral pulses are 2+ bilaterally  ABDOMEN: Nontender to palpation, normoactive bowel sounds, no rebound/guarding; No hepatosplenomegaly  MUSCULOSKELETAL: no clubbing or cyanosis of digits; no joint swelling or tenderness to palpation  PSYCH: A+O to person, place, and time; affect appropriate  NEUROLOGY: CN 2-12 are intact and symmetric; no gross sensory deficits   SKIN: No rashes; no palpable lesions   GENERAL: NAD, well-developed  CHEST/LUNG: Clear to auscultation bilaterally; No wheeze  HEART: Regular rate and rhythm  ABDOMEN: Soft, Nontender, Nondistended  EXTREMITIES: no LE edema  PSYCH: Calm  NEUROLOGY: AAOx3  SKIN: No rashes or lesions    Angina, CAD, BERNA, Enterovirus

## 2023-03-31 NOTE — DISCHARGE NOTE PROVIDER - NSDCFUSCHEDAPPT_GEN_ALL_CORE_FT
Laya Washington  [facility]  CARDIOLOGY [id number]  Scheduled Appointment: 04/19/2023

## 2023-03-31 NOTE — DISCHARGE NOTE PROVIDER - NSDCCPTREATMENT_GEN_ALL_CORE_FT
PRINCIPAL PROCEDURE  Procedure: Echo 2D  Findings and Treatment: CONCLUSIONS:  1. Mitral annular calcification, otherwise normal mitral  valve. Minimal mitral regurgitation.  2. Normal left ventricular internal dimensions and wall  thicknesses.  3. Normal left ventricular systolic function. No segmental  wall motion abnormalities.  4. Normal right ventricular size and function.        SECONDARY PROCEDURE  Procedure: Nuclear medicine cardiopulmonary stress test  Findings and Treatment: There are medium sized, severe defects in the apical and  inferior walls that are fixed, consistent with  Infarct.There is a medium sized, moderate defect in  lateral wall that is reversible consistent with ischemia.

## 2023-03-31 NOTE — DOWNTIME INTERRUPTION NOTE - TIME SYSTEM UNAVAILABLE
Dense breasts noted.  Recommend screening mammogram.  Will order is patient agreeable. 29-Mar-2023 08:00

## 2023-03-31 NOTE — DISCHARGE NOTE PROVIDER - HOSPITAL COURSE
63 M with PMH of HTN, HLD, CAD s/p PCI to LAD and LCx in 2015, who presents with atypical CP.     Chest pain.    -patient with atypical CP, begins at b/l axillae, radiates to chest and R side of back, and is burning in quality  -EKG without ischemic changes, no delta on troponin  -euvolemic on exam  -no further reports of chest pain  - TTE showed  Normal left ventricular systolic function. No segmental wall motion abnormalities.   -monitor on telemetry  -STAT EKG and cardiac enzymes if CP recurs  - cards follg, f/u cards recs  - stress test showed-     Cough.    -RVP + for entero/rhino virus  -supportive care.     Renal insufficiency.    mild creatinine elevation to 1.3 on 3/30, pt is fasting likely due to poor po intake  improved to 1/1 today with iv hydration  cont to monitor closely.    : CAD (coronary artery disease).   continue asa, metoprolol, losartan, statin and ranexa.    : Hypertension.   -c/w Losartan.        -Mild leukocytosis- ? stress reaction, pt with no fever or any new complaints, monitor for signs of 63 M with PMH of HTN, HLD, CAD s/p PCI to LAD and LCx in 2015, who presents with atypical CP. Found to be positive for entero/rhinovirus, received supportive care.   He was noted to have a mild leukocytosis in the setting of the rhinovirus infection.    Cardiology evaluated him for his chest pain. TTE showed normal LV systolic function without segmental wall motion abnormalities. He underwent a nuclear stress test, which showed ____    His creatinine transiently marco a while admitted and corrected after IV hydration was given.    63 M with PMH of HTN, HLD, CAD s/p PCI to LAD and LCx in 2015, who presents with atypical CP. Found to be positive for entero/rhinovirus, received supportive care.   He was noted to have a mild leukocytosis in the setting of the rhinovirus infection.    Cardiology evaluated him for his chest pain. TTE showed normal LV systolic function without segmental wall motion abnormalities.   He underwent a nuclear stress test, which showed was abnormal and pt had cath w/2 stents placed.    His creatinine transiently marco a while admitted and corrected after IV hydration was given.     Pt medically optimized for discharge on 4/4 w/addition of plavix to home meds.

## 2023-03-31 NOTE — DISCHARGE NOTE PROVIDER - NSDCMRMEDTOKEN_GEN_ALL_CORE_FT
aspirin 81 mg oral delayed release tablet: 1 tab(s) orally once a day  atorvastatin 80 mg oral tablet: 1 tab(s) orally once a day  famotidine 20 mg oral tablet: 1 tab(s) orally once a day  Hyzaar 100 mg-12.5 mg oral tablet: 1 orally once a day  metoprolol succinate 100 mg oral tablet, extended release: 1.5 tab(s) orally once a day  Ranexa 500 mg oral tablet, extended release: 1 tab(s) orally 2 times a day  Vitamin D3 50 mcg (2000 intl units) oral tablet, chewable: 1 orally   aspirin 81 mg oral delayed release tablet: 1 tab(s) orally once a day  atorvastatin 80 mg oral tablet: 1 tab(s) orally once a day  clopidogrel 75 mg oral tablet: 1 tab(s) orally once a day  famotidine 20 mg oral tablet: 1 tab(s) orally once a day  Hyzaar 100 mg-12.5 mg oral tablet: 1 orally once a day  metoprolol succinate 100 mg oral tablet, extended release: 1.5 tab(s) orally once a day  Ranexa 500 mg oral tablet, extended release: 1 tab(s) orally 2 times a day  Vitamin D3 50 mcg (2000 intl units) oral tablet, chewable: 1 orally

## 2023-03-31 NOTE — DISCHARGE NOTE PROVIDER - CARE PROVIDER_API CALL
Laya Washington)  Medicine  Cardiology  [phone] [place] [address], [address]  [place], [place] [id number]  Phone: [phone]  Fax: [phone]  Scheduled Appointment: 04/19/2023

## 2023-04-01 LAB
ANION GAP SERPL CALC-SCNC: 16 MMOL/L — HIGH (ref 7–14)
BUN SERPL-MCNC: 13 MG/DL — SIGNIFICANT CHANGE UP (ref 7–23)
CALCIUM SERPL-MCNC: 9.4 MG/DL — SIGNIFICANT CHANGE UP (ref 8.4–10.5)
CHLORIDE SERPL-SCNC: 103 MMOL/L — SIGNIFICANT CHANGE UP (ref 98–107)
CO2 SERPL-SCNC: 19 MMOL/L — LOW (ref 22–31)
CREAT SERPL-MCNC: 1.02 MG/DL — SIGNIFICANT CHANGE UP (ref 0.5–1.3)
EGFR: 82 ML/MIN/1.73M2 — SIGNIFICANT CHANGE UP
GLUCOSE SERPL-MCNC: 110 MG/DL — HIGH (ref 70–99)
HCT VFR BLD CALC: 48.5 % — SIGNIFICANT CHANGE UP (ref 39–50)
HGB BLD-MCNC: 16.3 G/DL — SIGNIFICANT CHANGE UP (ref 13–17)
MAGNESIUM SERPL-MCNC: 2.1 MG/DL — SIGNIFICANT CHANGE UP (ref 1.6–2.6)
MCHC RBC-ENTMCNC: 30.6 PG — SIGNIFICANT CHANGE UP (ref 27–34)
MCHC RBC-ENTMCNC: 33.6 GM/DL — SIGNIFICANT CHANGE UP (ref 32–36)
MCV RBC AUTO: 91.2 FL — SIGNIFICANT CHANGE UP (ref 80–100)
NRBC # BLD: 0 /100 WBCS — SIGNIFICANT CHANGE UP (ref 0–0)
NRBC # FLD: 0 K/UL — SIGNIFICANT CHANGE UP (ref 0–0)
PHOSPHATE SERPL-MCNC: 3.1 MG/DL — SIGNIFICANT CHANGE UP (ref 2.5–4.5)
PLATELET # BLD AUTO: 211 K/UL — SIGNIFICANT CHANGE UP (ref 150–400)
POTASSIUM SERPL-MCNC: 4 MMOL/L — SIGNIFICANT CHANGE UP (ref 3.5–5.3)
POTASSIUM SERPL-SCNC: 4 MMOL/L — SIGNIFICANT CHANGE UP (ref 3.5–5.3)
RBC # BLD: 5.32 M/UL — SIGNIFICANT CHANGE UP (ref 4.2–5.8)
RBC # FLD: 13 % — SIGNIFICANT CHANGE UP (ref 10.3–14.5)
SODIUM SERPL-SCNC: 138 MMOL/L — SIGNIFICANT CHANGE UP (ref 135–145)
WBC # BLD: 10.59 K/UL — HIGH (ref 3.8–10.5)
WBC # FLD AUTO: 10.59 K/UL — HIGH (ref 3.8–10.5)

## 2023-04-01 PROCEDURE — 99233 SBSQ HOSP IP/OBS HIGH 50: CPT

## 2023-04-01 RX ADMIN — Medication 100 MILLIGRAM(S): at 05:02

## 2023-04-01 RX ADMIN — RANOLAZINE 500 MILLIGRAM(S): 500 TABLET, FILM COATED, EXTENDED RELEASE ORAL at 05:03

## 2023-04-01 RX ADMIN — Medication 2000 UNIT(S): at 16:06

## 2023-04-01 RX ADMIN — Medication 81 MILLIGRAM(S): at 16:05

## 2023-04-01 RX ADMIN — LOSARTAN POTASSIUM 100 MILLIGRAM(S): 100 TABLET, FILM COATED ORAL at 05:02

## 2023-04-01 RX ADMIN — FAMOTIDINE 20 MILLIGRAM(S): 10 INJECTION INTRAVENOUS at 16:06

## 2023-04-01 RX ADMIN — ATORVASTATIN CALCIUM 80 MILLIGRAM(S): 80 TABLET, FILM COATED ORAL at 21:27

## 2023-04-01 RX ADMIN — RANOLAZINE 500 MILLIGRAM(S): 500 TABLET, FILM COATED, EXTENDED RELEASE ORAL at 16:05

## 2023-04-02 LAB
ANION GAP SERPL CALC-SCNC: 13 MMOL/L — SIGNIFICANT CHANGE UP (ref 7–14)
BUN SERPL-MCNC: 18 MG/DL — SIGNIFICANT CHANGE UP (ref 7–23)
CALCIUM SERPL-MCNC: 9 MG/DL — SIGNIFICANT CHANGE UP (ref 8.4–10.5)
CHLORIDE SERPL-SCNC: 100 MMOL/L — SIGNIFICANT CHANGE UP (ref 98–107)
CO2 SERPL-SCNC: 20 MMOL/L — LOW (ref 22–31)
CREAT SERPL-MCNC: 1.27 MG/DL — SIGNIFICANT CHANGE UP (ref 0.5–1.3)
EGFR: 63 ML/MIN/1.73M2 — SIGNIFICANT CHANGE UP
GLUCOSE SERPL-MCNC: 107 MG/DL — HIGH (ref 70–99)
HCT VFR BLD CALC: 45.5 % — SIGNIFICANT CHANGE UP (ref 39–50)
HGB BLD-MCNC: 15.5 G/DL — SIGNIFICANT CHANGE UP (ref 13–17)
MAGNESIUM SERPL-MCNC: 2 MG/DL — SIGNIFICANT CHANGE UP (ref 1.6–2.6)
MCHC RBC-ENTMCNC: 31.3 PG — SIGNIFICANT CHANGE UP (ref 27–34)
MCHC RBC-ENTMCNC: 34.1 GM/DL — SIGNIFICANT CHANGE UP (ref 32–36)
MCV RBC AUTO: 91.9 FL — SIGNIFICANT CHANGE UP (ref 80–100)
NRBC # BLD: 0 /100 WBCS — SIGNIFICANT CHANGE UP (ref 0–0)
NRBC # FLD: 0 K/UL — SIGNIFICANT CHANGE UP (ref 0–0)
PHOSPHATE SERPL-MCNC: 4.3 MG/DL — SIGNIFICANT CHANGE UP (ref 2.5–4.5)
PLATELET # BLD AUTO: 174 K/UL — SIGNIFICANT CHANGE UP (ref 150–400)
POTASSIUM SERPL-MCNC: 3.9 MMOL/L — SIGNIFICANT CHANGE UP (ref 3.5–5.3)
POTASSIUM SERPL-SCNC: 3.9 MMOL/L — SIGNIFICANT CHANGE UP (ref 3.5–5.3)
RBC # BLD: 4.95 M/UL — SIGNIFICANT CHANGE UP (ref 4.2–5.8)
RBC # FLD: 13.1 % — SIGNIFICANT CHANGE UP (ref 10.3–14.5)
SODIUM SERPL-SCNC: 133 MMOL/L — LOW (ref 135–145)
WBC # BLD: 9.37 K/UL — SIGNIFICANT CHANGE UP (ref 3.8–10.5)
WBC # FLD AUTO: 9.37 K/UL — SIGNIFICANT CHANGE UP (ref 3.8–10.5)

## 2023-04-02 PROCEDURE — 99232 SBSQ HOSP IP/OBS MODERATE 35: CPT

## 2023-04-02 PROCEDURE — 99232 SBSQ HOSP IP/OBS MODERATE 35: CPT | Mod: GC

## 2023-04-02 RX ORDER — SODIUM CHLORIDE 9 MG/ML
500 INJECTION INTRAMUSCULAR; INTRAVENOUS; SUBCUTANEOUS ONCE
Refills: 0 | Status: COMPLETED | OUTPATIENT
Start: 2023-04-02 | End: 2023-04-02

## 2023-04-02 RX ADMIN — FAMOTIDINE 20 MILLIGRAM(S): 10 INJECTION INTRAVENOUS at 15:23

## 2023-04-02 RX ADMIN — ATORVASTATIN CALCIUM 80 MILLIGRAM(S): 80 TABLET, FILM COATED ORAL at 21:26

## 2023-04-02 RX ADMIN — Medication 2000 UNIT(S): at 15:23

## 2023-04-02 RX ADMIN — Medication 100 MILLIGRAM(S): at 05:45

## 2023-04-02 RX ADMIN — Medication 81 MILLIGRAM(S): at 15:23

## 2023-04-02 RX ADMIN — LOSARTAN POTASSIUM 100 MILLIGRAM(S): 100 TABLET, FILM COATED ORAL at 05:45

## 2023-04-02 RX ADMIN — RANOLAZINE 500 MILLIGRAM(S): 500 TABLET, FILM COATED, EXTENDED RELEASE ORAL at 05:45

## 2023-04-02 RX ADMIN — RANOLAZINE 500 MILLIGRAM(S): 500 TABLET, FILM COATED, EXTENDED RELEASE ORAL at 15:23

## 2023-04-02 RX ADMIN — SODIUM CHLORIDE 100 MILLILITER(S): 9 INJECTION INTRAMUSCULAR; INTRAVENOUS; SUBCUTANEOUS at 09:15

## 2023-04-03 LAB
ANION GAP SERPL CALC-SCNC: 14 MMOL/L — SIGNIFICANT CHANGE UP (ref 7–14)
BUN SERPL-MCNC: 16 MG/DL — SIGNIFICANT CHANGE UP (ref 7–23)
CALCIUM SERPL-MCNC: 9.2 MG/DL — SIGNIFICANT CHANGE UP (ref 8.4–10.5)
CHLORIDE SERPL-SCNC: 104 MMOL/L — SIGNIFICANT CHANGE UP (ref 98–107)
CO2 SERPL-SCNC: 19 MMOL/L — LOW (ref 22–31)
CREAT SERPL-MCNC: 1.3 MG/DL — SIGNIFICANT CHANGE UP (ref 0.5–1.3)
EGFR: 61 ML/MIN/1.73M2 — SIGNIFICANT CHANGE UP
GLUCOSE SERPL-MCNC: 115 MG/DL — HIGH (ref 70–99)
HCT VFR BLD CALC: 46.2 % — SIGNIFICANT CHANGE UP (ref 39–50)
HGB BLD-MCNC: 15.7 G/DL — SIGNIFICANT CHANGE UP (ref 13–17)
MAGNESIUM SERPL-MCNC: 2 MG/DL — SIGNIFICANT CHANGE UP (ref 1.6–2.6)
MCHC RBC-ENTMCNC: 31 PG — SIGNIFICANT CHANGE UP (ref 27–34)
MCHC RBC-ENTMCNC: 34 GM/DL — SIGNIFICANT CHANGE UP (ref 32–36)
MCV RBC AUTO: 91.1 FL — SIGNIFICANT CHANGE UP (ref 80–100)
NRBC # BLD: 0 /100 WBCS — SIGNIFICANT CHANGE UP (ref 0–0)
NRBC # FLD: 0 K/UL — SIGNIFICANT CHANGE UP (ref 0–0)
PHOSPHATE SERPL-MCNC: 3.1 MG/DL — SIGNIFICANT CHANGE UP (ref 2.5–4.5)
PLATELET # BLD AUTO: 202 K/UL — SIGNIFICANT CHANGE UP (ref 150–400)
POTASSIUM SERPL-MCNC: 4.4 MMOL/L — SIGNIFICANT CHANGE UP (ref 3.5–5.3)
POTASSIUM SERPL-SCNC: 4.4 MMOL/L — SIGNIFICANT CHANGE UP (ref 3.5–5.3)
RBC # BLD: 5.07 M/UL — SIGNIFICANT CHANGE UP (ref 4.2–5.8)
RBC # FLD: 13 % — SIGNIFICANT CHANGE UP (ref 10.3–14.5)
SODIUM SERPL-SCNC: 137 MMOL/L — SIGNIFICANT CHANGE UP (ref 135–145)
WBC # BLD: 9.73 K/UL — SIGNIFICANT CHANGE UP (ref 3.8–10.5)
WBC # FLD AUTO: 9.73 K/UL — SIGNIFICANT CHANGE UP (ref 3.8–10.5)

## 2023-04-03 PROCEDURE — 92928 PRQ TCAT PLMT NTRAC ST 1 LES: CPT | Mod: LC

## 2023-04-03 PROCEDURE — 92921: CPT | Mod: LC

## 2023-04-03 PROCEDURE — 99152 MOD SED SAME PHYS/QHP 5/>YRS: CPT

## 2023-04-03 PROCEDURE — 93454 CORONARY ARTERY ANGIO S&I: CPT | Mod: 26,59

## 2023-04-03 PROCEDURE — 92978 ENDOLUMINL IVUS OCT C 1ST: CPT | Mod: 26,LC

## 2023-04-03 PROCEDURE — 99232 SBSQ HOSP IP/OBS MODERATE 35: CPT

## 2023-04-03 PROCEDURE — 93010 ELECTROCARDIOGRAM REPORT: CPT

## 2023-04-03 PROCEDURE — 99233 SBSQ HOSP IP/OBS HIGH 50: CPT

## 2023-04-03 RX ORDER — LANOLIN ALCOHOL/MO/W.PET/CERES
3 CREAM (GRAM) TOPICAL AT BEDTIME
Refills: 0 | Status: DISCONTINUED | OUTPATIENT
Start: 2023-04-03 | End: 2023-04-04

## 2023-04-03 RX ORDER — ONDANSETRON 8 MG/1
4 TABLET, FILM COATED ORAL EVERY 8 HOURS
Refills: 0 | Status: DISCONTINUED | OUTPATIENT
Start: 2023-04-03 | End: 2023-04-04

## 2023-04-03 RX ORDER — SODIUM CHLORIDE 9 MG/ML
1000 INJECTION INTRAMUSCULAR; INTRAVENOUS; SUBCUTANEOUS
Refills: 0 | Status: DISCONTINUED | OUTPATIENT
Start: 2023-04-03 | End: 2023-04-04

## 2023-04-03 RX ORDER — CLOPIDOGREL BISULFATE 75 MG/1
75 TABLET, FILM COATED ORAL DAILY
Refills: 0 | Status: DISCONTINUED | OUTPATIENT
Start: 2023-04-04 | End: 2023-04-04

## 2023-04-03 RX ORDER — ACETAMINOPHEN 500 MG
650 TABLET ORAL EVERY 6 HOURS
Refills: 0 | Status: DISCONTINUED | OUTPATIENT
Start: 2023-04-03 | End: 2023-04-04

## 2023-04-03 RX ADMIN — SODIUM CHLORIDE 75 MILLILITER(S): 9 INJECTION INTRAMUSCULAR; INTRAVENOUS; SUBCUTANEOUS at 23:26

## 2023-04-03 RX ADMIN — RANOLAZINE 500 MILLIGRAM(S): 500 TABLET, FILM COATED, EXTENDED RELEASE ORAL at 05:49

## 2023-04-03 RX ADMIN — Medication 100 MILLIGRAM(S): at 05:49

## 2023-04-03 RX ADMIN — ATORVASTATIN CALCIUM 80 MILLIGRAM(S): 80 TABLET, FILM COATED ORAL at 22:44

## 2023-04-03 RX ADMIN — LOSARTAN POTASSIUM 100 MILLIGRAM(S): 100 TABLET, FILM COATED ORAL at 05:49

## 2023-04-03 NOTE — PROGRESS NOTE ADULT - PROBLEM SELECTOR PLAN 3
mild creatinine elevation to 1.3 on 3/30, pt is fasting likely due to poor po intake  improved after hydration, now within normal range  cont to monitor closely
mild creatinine elevation to 1.3 on 3/30, pt is fasting likely due to poor po intake  improved to 1/1 today with iv hydration  cont to monitor closely
mild creatinine elevation to 1.3 today, pt is fasting likely due to poor po intake  will give gentle hydration  cont to monitor closley
mild creatinine elevation to 1.3 on 3/30, pt is fasting likely due to poor po intake  improved after hydration, now within normal range  cont to monitor closely
mild creatinine elevation to 1.3 on 3/30, pt is fasting likely due to poor po intake  improved after hydration, now within normal range  cont to monitor closely
without difficulty

## 2023-04-03 NOTE — PROGRESS NOTE ADULT - PROBLEM SELECTOR PLAN 6
-DVT ppx: low risk, encourage ambulation    -Mild leukocytosis- ? stress reaction, pt with no fever or any new complaints, monitor for signs of infection    -Updated daughter Jackelin on 3/31    -Diet: DASH    Plan discussed with ACP
-DVT ppx: low risk, encourage ambulation    -Mild leukocytosis- ? stress reaction, pt with no fever or any new complaints, monitor for signs of infection. WBC stable today.    -Updated daughter Jackelin on 4/1    -Diet: DASH    Plan discussed with ACP    Likely dc tomorrow if stress testing is unremarkable.
-DVT ppx: low risk, encourage ambulation    -Diet: DASH
-DVT ppx: low risk, encourage ambulation    Updated son at bedside on 4/3.
-DVT ppx: low risk, encourage ambulation    -Updated daughter Jackelin on 4/1    -Diet: DASH    Plan discussed with ACP    Likely dc today if stress testing is unremarkable.

## 2023-04-03 NOTE — PROGRESS NOTE ADULT - PROBLEM SELECTOR PLAN 4
-continue asa, metoprolol, losartan, statin and ranexa

## 2023-04-03 NOTE — PROGRESS NOTE ADULT - PROBLEM SELECTOR PLAN 2
-RVP + for entero/rhino virus  -supportive care
-RVP + for entero/rhino virus  -supportive care
-RVP + for entero/rhino virus  -supportive care  - leukocytosis resolved
-RVP + for entero/rhino virus  -supportive care  - leukocytosis resolved
-RVP + for entero/rhino virus  -supportive care

## 2023-04-03 NOTE — PROGRESS NOTE ADULT - PROBLEM SELECTOR PLAN 1
-patient with atypical CP, begins at b/l axillae, radiates to chest and R side of back, and is burning in quality  -EKG without ischemic changes, no delta on troponin  -euvolemic on exam  -no further reports of chest pain  - TTE showed  Normal left ventricular systolic function. No segmental wall motion abnormalities.   -monitor on telemetry  -STAT EKG and cardiac enzymes if CP recurs  - cards follg, f/u cards recs  - stress test performed 3/31, resting phase to be performed 4/2
-patient with atypical CP, begins at b/l axillae, radiates to chest and R side of back, and is burning in quality  -EKG without ischemic changes, no delta on troponin  - TTE showed  Normal left ventricular systolic function. No segmental wall motion abnormalities.   -monitor on telemetry  -STAT EKG and cardiac enzymes if CP recurs  - cards following, f/u cards recs  - NST abnormal -- plan for cath today
-patient with atypical CP, begins at b/l axillae, radiates to chest and R side of back, and is burning in quality  -EKG without ischemic changes, no delta on troponin  -euvolemic on exam  - TTE showed  Normal left ventricular systolic function. No segmental wall motion abnormalities.   -monitor on telemetry  -STAT EKG and cardiac enzymes if CP recurs  - cards follg, f/u cards recs
-patient with atypical CP, begins at b/l axillae, radiates to chest and R side of back, and is burning in quality  -EKG without ischemic changes, no delta on troponin  -euvolemic on exam  -no further reports of chest pain  - TTE showed  Normal left ventricular systolic function. No segmental wall motion abnormalities.   -monitor on telemetry  -STAT EKG and cardiac enzymes if CP recurs  - cards following, f/u cards recs  - stress test performed 3/31, resting phase to be performed today 4/2
-patient with atypical CP, begins at b/l axillae, radiates to chest and R side of back, and is burning in quality  -EKG without ischemic changes, no delta on troponin  -euvolemic on exam  -no further reports of chest pain  - TTE showed  Normal left ventricular systolic function. No segmental wall motion abnormalities.   -monitor on telemetry  -STAT EKG and cardiac enzymes if CP recurs  - cards follg, f/u cards recs  - stress test today, if neg and if ok with cards, can be dc later today

## 2023-04-04 ENCOUNTER — TRANSCRIPTION ENCOUNTER (OUTPATIENT)
Age: 63
End: 2023-04-04

## 2023-04-04 VITALS
TEMPERATURE: 98 F | HEART RATE: 96 BPM | RESPIRATION RATE: 17 BRPM | SYSTOLIC BLOOD PRESSURE: 97 MMHG | DIASTOLIC BLOOD PRESSURE: 55 MMHG | OXYGEN SATURATION: 99 %

## 2023-04-04 LAB
ANION GAP SERPL CALC-SCNC: 13 MMOL/L — SIGNIFICANT CHANGE UP (ref 7–14)
BUN SERPL-MCNC: 16 MG/DL — SIGNIFICANT CHANGE UP (ref 7–23)
CALCIUM SERPL-MCNC: 9.1 MG/DL — SIGNIFICANT CHANGE UP (ref 8.4–10.5)
CHLORIDE SERPL-SCNC: 104 MMOL/L — SIGNIFICANT CHANGE UP (ref 98–107)
CO2 SERPL-SCNC: 21 MMOL/L — LOW (ref 22–31)
CREAT SERPL-MCNC: 1.28 MG/DL — SIGNIFICANT CHANGE UP (ref 0.5–1.3)
EGFR: 62 ML/MIN/1.73M2 — SIGNIFICANT CHANGE UP
GLUCOSE SERPL-MCNC: 98 MG/DL — SIGNIFICANT CHANGE UP (ref 70–99)
HCT VFR BLD CALC: 45.6 % — SIGNIFICANT CHANGE UP (ref 39–50)
HGB BLD-MCNC: 15.2 G/DL — SIGNIFICANT CHANGE UP (ref 13–17)
MAGNESIUM SERPL-MCNC: 2 MG/DL — SIGNIFICANT CHANGE UP (ref 1.6–2.6)
MCHC RBC-ENTMCNC: 31 PG — SIGNIFICANT CHANGE UP (ref 27–34)
MCHC RBC-ENTMCNC: 33.3 GM/DL — SIGNIFICANT CHANGE UP (ref 32–36)
MCV RBC AUTO: 92.9 FL — SIGNIFICANT CHANGE UP (ref 80–100)
NRBC # BLD: 0 /100 WBCS — SIGNIFICANT CHANGE UP (ref 0–0)
NRBC # FLD: 0 K/UL — SIGNIFICANT CHANGE UP (ref 0–0)
PHOSPHATE SERPL-MCNC: 3.7 MG/DL — SIGNIFICANT CHANGE UP (ref 2.5–4.5)
PLATELET # BLD AUTO: 207 K/UL — SIGNIFICANT CHANGE UP (ref 150–400)
POTASSIUM SERPL-MCNC: 4.2 MMOL/L — SIGNIFICANT CHANGE UP (ref 3.5–5.3)
POTASSIUM SERPL-SCNC: 4.2 MMOL/L — SIGNIFICANT CHANGE UP (ref 3.5–5.3)
RBC # BLD: 4.91 M/UL — SIGNIFICANT CHANGE UP (ref 4.2–5.8)
RBC # FLD: 12.9 % — SIGNIFICANT CHANGE UP (ref 10.3–14.5)
SODIUM SERPL-SCNC: 138 MMOL/L — SIGNIFICANT CHANGE UP (ref 135–145)
WBC # BLD: 7.87 K/UL — SIGNIFICANT CHANGE UP (ref 3.8–10.5)
WBC # FLD AUTO: 7.87 K/UL — SIGNIFICANT CHANGE UP (ref 3.8–10.5)

## 2023-04-04 PROCEDURE — 99232 SBSQ HOSP IP/OBS MODERATE 35: CPT

## 2023-04-04 PROCEDURE — 99239 HOSP IP/OBS DSCHRG MGMT >30: CPT

## 2023-04-04 RX ORDER — CLOPIDOGREL BISULFATE 75 MG/1
1 TABLET, FILM COATED ORAL
Qty: 30 | Refills: 0
Start: 2023-04-04 | End: 2023-05-03

## 2023-04-04 RX ADMIN — CLOPIDOGREL BISULFATE 75 MILLIGRAM(S): 75 TABLET, FILM COATED ORAL at 12:10

## 2023-04-04 RX ADMIN — RANOLAZINE 500 MILLIGRAM(S): 500 TABLET, FILM COATED, EXTENDED RELEASE ORAL at 05:26

## 2023-04-04 RX ADMIN — Medication 81 MILLIGRAM(S): at 12:10

## 2023-04-04 RX ADMIN — LOSARTAN POTASSIUM 100 MILLIGRAM(S): 100 TABLET, FILM COATED ORAL at 05:26

## 2023-04-04 RX ADMIN — FAMOTIDINE 20 MILLIGRAM(S): 10 INJECTION INTRAVENOUS at 12:10

## 2023-04-04 RX ADMIN — Medication 2000 UNIT(S): at 12:10

## 2023-04-04 RX ADMIN — Medication 100 MILLIGRAM(S): at 05:26

## 2023-04-04 NOTE — DISCHARGE NOTE NURSING/CASE MANAGEMENT/SOCIAL WORK - NSDCVIVACCINE_GEN_ALL_CORE_FT
influenza, injectable, quadrivalent, preservative free; 10-Sep-2015 17:07; Nelida Camacho (RN); [employer]; [id number]; IntraMuscular; Deltoid Left.; 0.5 milliLiter(s); VIS (VIS Published: 19-Aug-2014, VIS Presented: 10-Sep-2015);

## 2023-04-04 NOTE — DISCHARGE NOTE NURSING/CASE MANAGEMENT/SOCIAL WORK - NSDCPEFALRISK_GEN_ALL_CORE
I am sending this note to inform you that I have discharged your patient from my services as patient is stable overall with no indication for ongoing palliative care visits at this time. If future services are needed, you may re-refer the patient to [facility]. Thank you
For information on Fall & Injury Prevention, visit: [email or web address] OR  [email or web address] OR  https://www.cdc.gov/steadi/patient.html

## 2023-04-04 NOTE — PROGRESS NOTE ADULT - PROVIDER SPECIALTY LIST ADULT
Cardiology
Hospitalist

## 2023-04-04 NOTE — PROGRESS NOTE ADULT - ASSESSMENT
63 M with PMH of HTN, HLD, CAD s/p PCI to LAD and LCx in 2015, who presents with atypical CP.
64 y/o M with PMH of HTN, HLD, CAD s/p stents x2 (2015), eczema, GERD who presents with 2 episodes of exertional chest pain concerning for angina. Low suspicion for ACS given negative troponin T and lack of ischemic changes seen on EKG    Troponin T 20 ->21. ProBNP 72  ECG: NSR, no ST changes, no TWI, no Q waves.  CXR: clear lungs  TTE: EF 62%, normal LVSF, no segmental wall motion abnormalities, normal RV size and function    #Angina  -TEODORA Sc : 4; HEART Sc: 4 points, moderate  -continue home anti-anginal agents: Ranexa 500 mg BID and toprol 100 mg XL   -ASA 81 mg daily and atorvastatin 80 mg daily  -monitor on telemetry  -f/u nuclear stress test results    Note not finalized until signed by attending.     Diann Lenz MD  Internal Medicine PGY2    
62 y/o M with PMH of HTN, HLD, CAD s/p stents x2 (2015), eczema, GERD who presents with 2 episodes of exertional chest pain concerning for angina. Low suspicion for ACS given negative troponin T and lack of ischemic changes seen on EKG    Troponin T 20 ->21. ProBNP 72  ECG: NSR, no ST changes, no TWI, no Q waves.  CXR: clear lungs  TTE: EF 62%, normal LVSF, no segmental wall motion abnormalities, normal RV size and function    #Angina:  -prior history of CAD with stents in 2015  -initial portion of stress test with perfusion defects, but needs second part today to assess if infarct or ischemia, will follow up.  --continue home anti-anginal agents: Ranexa 500 mg BID and toprol 100 mg XL   -ASA 81 mg daily and atorvastatin 80 mg daily  -monitor on telemetry    
64 y/o M with PMH of HTN, HLD, CAD s/p stents x2 (2015), eczema, GERD who presents with 2 episodes of exertional chest pain concerning for angina. Low suspicion for ACS given negative troponin T and lack of ischemic changes seen on EKG. During admission received TTE with normal LVSF and NST with reversible defects in lateral wall. Planned for LHC to further evaluate.    Troponin T 20 ->21. ProBNP 72  ECG: NSR, no ST changes, no TWI, no Q waves.  CXR: clear lungs  TTE: EF 62%, normal LVSF, no segmental wall motion abnormalities, normal RV size and function    #Angina:  -prior history of CAD with stents in 2015  -TTE with normal LVSF and NST with reversible defects in lateral wall. Planned for LHC to further evaluate.  -continue home anti-anginal agents: Ranexa 500 mg BID and toprol 100 mg XL   -ASA 81 mg daily and atorvastatin 80 mg daily  -monitor on telemetry    Solis Duran MD  Cardiology Fellow - PGY4    Please check [email or web address]: [email or web address] for cardiology service schedule and contact information.   
62 y/o M with PMH of HTN, HLD, CAD s/p stents x2 (2015), eczema, GERD who presents with 2 episodes of exertional chest pain concerning for angina. Low suspicion for ACS given negative troponin T and lack of ischemic changes seen on EKG. During admission received TTE with normal LVSF and NST with reversible defects in lateral wall. Taken for LHC on 4/3, received PCI w/ 2x stents to pLCx and mLCx and POBA to OM2. Remains chest pain free, hemodynamically stable.    #Angina:  -prior history of CAD with stents in 2015  -TTE with normal LVSF and NST with reversible defects in lateral wall  -s/p LHC as above w/ PCI 2x stents to pLCx and mLCx and POBA to OM2  -continue home anti-anginal agents: Ranexa 500 mg BID and toprol 100 mg XL   -c/w ASA 81mg daily   -c/w plavix 75mg daily  -c/w atorvastatin 80mg daily  -monitor on telemetry while admitted    At discharge please ensure pt has prescriptions for the above medications and close follow-up with cardiology scheduled in 1-2 weeks post-discharge. Cardiology will sign off at this time, please re-consult with any questions.    Solis Duran MD  Cardiology Fellow - PGY4    Please check [email or web address]: [email or web address] for cardiology service schedule and contact information.     
63 M with PMH of HTN, HLD, CAD s/p PCI to LAD and LCx in 2015, who presents with atypical CP.

## 2023-04-04 NOTE — CHART NOTE - NSCHARTNOTEFT_GEN_A_CORE
Pt seen at bedside, feeling well, plan for dc today. Tele w/NSR, no events.  See DC summary.
Pt went for cath procedure today with Right radial artery accessed. Pt denies SOB, CP, swelling, edema, paresthesia distal to site or pain at site. Site checked. No ecchymosis, hematoma, or swelling within access point. Active and passive ROM of R shoulder, R elbow, R wrist, and fingers without deficits. Motor and Sensory intact. 2+ peripheral pulses intact. Capillary refill less than 2 seconds. Pt educated to look out for bruising, swelling, tingling, and numbness of site/distal to site and notify RN. Will continue to monitor overnight.     Grace Cardozo, [facility]  Medicine [id number]
First part of nuclear stress test abnormal, patient needs second portion on Sunday.

## 2023-04-04 NOTE — DISCHARGE NOTE NURSING/CASE MANAGEMENT/SOCIAL WORK - PATIENT PORTAL LINK FT
You can access the FollowMyHealth Patient Portal offered by [facility] by registering at the following website: [email or web address]. By joining [employer]’s FollowMyHealth portal, you will also be able to view your health information using other applications (apps) compatible with our system.

## 2023-04-04 NOTE — PROGRESS NOTE ADULT - SUBJECTIVE AND OBJECTIVE BOX
Cardiology Progress Note  ------------------------------------------------------------------------------------------  SUBJECTIVE:   - NSR  - No events overnight. Denies CP, SOB or Palpitations.   -------------------------------------------------------------------------------------------  ROS:  CV: chest pain (-), palpitation (-), orthopnea (-), PND (-), edema (-)  PULM: SOB (-), cough (-), wheezing (-), hemoptysis (-).   CONST: fever (-), chills (-) or fatigue (-)  GI: abdominal distension (-), abdominal pain (-) , nausea/vomiting (-), hematemesis, (-), melena (-), hematochezia (-)  : dysuria (-), frequency (-), hematuria (-).   NEURO: numbness (-), weakness (-), dizziness (-)  MSK: myalgia (-), joint pain (-)   SKIN: itching (-), rash (-)  HEENT:  visual changes (-); vertigo or throat pain (-);  neck stiffness (-)   Psych: change in mood (-), anxiety (-), depression (-)     All other review of systems is negative unless indicated above.   -------------------------------------------------------------------------------------------  VS:  T(F): 98.2 (04-03), Max: 98.3 (04-02)  HR: 77 (04-03) (76 - 77)  BP: 115/63 (04-03) (115/63 - 137/83)  RR: 16 (04-03)  SpO2: 100% (04-03)  I&O's Summary    PHYSICAL EXAM:  GENERAL: NAD  HEAD:  Atraumatic, Normocephalic.  EYES: EOMI, PERRLA, conjunctiva and sclera clear.  ENT: Moist mucous membranes.  NECK: Supple, No JVD.  CHEST/LUNG: Clear to auscultation bilaterally; No rales, rhonchi, wheezing, or rubs. Unlabored respirations.  HEART: Regular rate and rhythm; No murmurs, rubs, or gallops.  ABDOMEN: Bowel sounds present; Soft, Nontender, Nondistended.   EXTREMITIES: No edema. 2+ Peripheral Pulses, brisk capillary refill. No clubbing or cyanosis.   PSYCH: Normal affect.  SKIN: No rashes or lesions.  -------------------------------------------------------------------------------------------  LABS:                          15.7   9.73  )-----------( 202 ( 03 Apr 2023 07:12 )             46.2     04-03    137  |  104  |  16  ----------------------------<  115<H>  4.4   |  19<L>  |  1.30    Ca    9.2      03 Apr 2023 07:12  Phos  3.1     04-03  Mg     2.00     04-03        CARDIAC MARKERS ( 29 Mar 2023 15:50 )  21 ng/L / x     / x     / x     / x     / x      CARDIAC MARKERS ( 29 Mar 2023 12:36 )  20 ng/L / x     / x     / x     / x     / x                -------------------------------------------------------------------------------------------  Meds:  aspirin enteric coated 81 milliGRAM(s) Oral daily  atorvastatin 80 milliGRAM(s) Oral at bedtime  cholecalciferol 2000 Unit(s) Oral daily  famotidine    Tablet 20 milliGRAM(s) Oral daily  losartan 100 milliGRAM(s) Oral daily  metoprolol succinate  milliGRAM(s) Oral daily  ranolazine 500 milliGRAM(s) Oral two times a day    -------------------------------------------------------------------------------------------  NST 3/31/23:  IMPRESSIONS:Abnormal Study  * Exercise capacity: 8 METS, Fair for age and gender. 84%  of MPHR.  * Chest Pain: Developed grade 4 sharp midsternal chest  discomfort (on a scale of 1 to 10) at 05:35 min of  exercise at a HR of 117 which resolved by 27 minutes of  recovery.  * Symptom: Sharp midsternal chest pain, Dyspnea.  * HR Response: HR failed to increase appropiately,  likely  due to medications.  * BP Response: Blunted-inadequate elevation with stress.  * Heart Rhythm: Normal Sinus Rhythm - 83 BPM.  * Baseline ECG: T wave abnormality in aVL.  * ECG Changes: ST Depression: 1-2 mm horizontal to  upsloping in leads II , III, aVR, aVF, V4, V5, V6 started  at 03:41 min of exercise at HR of 110 and persisted 06:00  min into recovery.  * Arrhythmia: Single APD occurred during stress.  * Boyce Score: 8  * There are medium sized, severe defects in the apical and  inferior walls that are fixed, consistent with  Infarct.There is a medium sized, moderate defect in  lateral wall thatis reversible consistent with ischemia.  * Post-stress gated wall motion analysis was performed  (LVEF = 29 %;LVEDV = 143 ml.), revealing severe overall  hypokinesis.  Conclusion:  There are medium sized, severe defects in the apical and  inferior walls that are fixed, consistent with  Infarct.There is a medium sized, moderate defect in  lateral wall that is reversible consistent with ischemia.    TTE 3/30/23:  Ejection Fraction (Modified Chen Rule): 62 %  ------------------------------------------------------------------------  OBSERVATIONS:  Mitral Valve: Mitral annular calcification, otherwise  normal mitral valve. Minimal mitral regurgitation.  Aortic Root: Normal size aortic root. (Ao:3.3 cm).  Aortic Valve: Calcified trileaflet aortic valve with normal  opening. Minimal aortic regurgitation.  Left Atrium: Normal left atrium.  LA volume index = 17  cc/m2.  Left Ventricle: Normal left ventricular systolic function.  No segmental wall motion abnormalities. Normal left  ventricular internal dimensions and wall thicknesses.  Right Heart: Normal right atrium. Normal right ventricular  size and function. Normal tricuspid valve.  Minimal  tricuspid regurgitation. Normal pulmonic valve.  Pericardium/PleuraNormal pericardium with no pericardial  effusion.  ------------------------------------------------------------------------  CONCLUSIONS:  1. Mitral annular calcification, otherwise normal mitral  valve. Minimal mitral regurgitation.  2. Normal left ventricular internal dimensions and wall  thicknesses.  3. Normal left ventricular systolic function. No segmental  wall motion abnormalities.  4. Normal right ventricular size and function.    -------------------------------------------------------------------------------------------  
Misty Olivas MD  Cardiology Fellow  All Cardiology service information can be found 24/7 on [email or web address], password: [email or web address]      Overnight Events: Patient doing well. No chest pain. Awaiting second part of nuclear stress test    Review Of Systems: No chest pain, shortness of breath, or palpitations            Current Meds:  aspirin enteric coated 81 milliGRAM(s) Oral daily  atorvastatin 80 milliGRAM(s) Oral at bedtime  cholecalciferol 2000 Unit(s) Oral daily  famotidine    Tablet 20 milliGRAM(s) Oral daily  losartan 100 milliGRAM(s) Oral daily  metoprolol succinate  milliGRAM(s) Oral daily  ranolazine 500 milliGRAM(s) Oral two times a day  sodium chloride 0.9% Bolus 500 milliLiter(s) IV Bolus once      Vitals:  T(F): 98.2 (04-02), Max: 98.2 (04-01)  HR: 75 (04-02) (75 - 109)  BP: 130/75 (04-02) (121/74 - 130/75)  RR: 18 (04-02)  SpO2: 100% (04-02)  I&O's Summary      Physical Exam:  Appearance: No acute distress; well appearing  Eyes: PERRL, EOMI, pink conjunctiva  HEENT: Normal oral mucosa  Cardiovascular: RRR, S1, S2, no murmurs, rubs, or gallops; no edema; no JVD  Respiratory: Clear to auscultation bilaterally  Gastrointestinal: soft, non-tender, non-distended with normal bowel sounds  Musculoskeletal: No clubbing; no joint deformity   Neurologic: Non-focal  Lymphatic: No lymphadenopathy  Psychiatry: AAOx3, mood & affect appropriate  Skin: No rashes, ecchymoses, or cyanosis                          15.5   9.37  )-----------( 174      ( 02 Apr 2023 05:02 )             45.5     04-02    133<L>  |  100  |  18  ----------------------------<  107<H>  3.9   |  20<L>  |  1.27    Ca    9.0      02 Apr 2023 05:02  Phos  4.3     04-02  Mg     2.00     04-02        CARDIAC MARKERS ( 29 Mar 2023 15:50 )  21 ng/L / x     / x     / x     / x     / x      CARDIAC MARKERS ( 29 Mar 2023 12:36 )  20 ng/L / x     / x     / x     / x     / x          
Cardiology Progress Note  ------------------------------------------------------------------------------------------  SUBJECTIVE:   - Tele: NSR  - No events overnight. Denies CP, SOB or Palpitations.   -------------------------------------------------------------------------------------------  ROS:  CV: chest pain (-), palpitation (-), orthopnea (-), PND (-), edema (-)  PULM: SOB (-), cough (-), wheezing (-), hemoptysis (-).   CONST: fever (-), chills (-) or fatigue (-)  GI: abdominal distension (-), abdominal pain (-) , nausea/vomiting (-), hematemesis, (-), melena (-), hematochezia (-)  : dysuria (-), frequency (-), hematuria (-).   NEURO: numbness (-), weakness (-), dizziness (-)  MSK: myalgia (-), joint pain (-)   SKIN: itching (-), rash (-)  HEENT:  visual changes (-); vertigo or throat pain (-);  neck stiffness (-)   Psych: change in mood (-), anxiety (-), depression (-)     All other review of systems is negative unless indicated above.   -------------------------------------------------------------------------------------------  VS:  T(F): 98 (04-04), Max: 98.7 (04-03)  HR: 76 (04-04) (68 - 76)  BP: 140/66 (04-04) (116/65 - 140/66)  RR: 16 (04-04)  SpO2: 100% (04-04)  I&O's Summary    PHYSICAL EXAM:  GENERAL: NAD  HEAD:  Atraumatic, Normocephalic.  EYES: EOMI, PERRLA, conjunctiva and sclera clear.  ENT: Moist mucous membranes.  NECK: Supple, No JVD.  CHEST/LUNG: Clear to auscultation bilaterally; No rales, rhonchi, wheezing, or rubs. Unlabored respirations.  HEART: Regular rate and rhythm; No murmurs, rubs, or gallops.  ABDOMEN: Bowel sounds present; Soft, Nontender, Nondistended.   EXTREMITIES: R radial access site c/d/i, no hematoma, non-bleeding, non-tender. No LE edema. 2+ Peripheral Pulses, brisk capillary refill. No clubbing or cyanosis.   PSYCH: Normal affect.  SKIN: No rashes or lesions.  -------------------------------------------------------------------------------------------  LABS:                          15.2   7.87  )-----------( 207      ( 04 Apr 2023 06:15 )             45.6     04-04    138  |  104  |  16  ----------------------------<  98  4.2   |  21<L>  |  1.28    Ca    9.1      04 Apr 2023 06:15  Phos  3.7     04-04  Mg     2.00     04-04        CARDIAC MARKERS ( 29 Mar 2023 15:50 )  21 ng/L / x     / x     / x     / x     / x      CARDIAC MARKERS ( 29 Mar 2023 12:36 )  20 ng/L / x     / x     / x     / x     / x                -------------------------------------------------------------------------------------------  Meds:  acetaminophen     Tablet .. 650 milliGRAM(s) Oral every 6 hours PRN  aluminum hydroxide/magnesium hydroxide/simethicone Suspension 30 milliLiter(s) Oral every 4 hours PRN  aspirin enteric coated 81 milliGRAM(s) Oral daily  atorvastatin 80 milliGRAM(s) Oral at bedtime  cholecalciferol 2000 Unit(s) Oral daily  clopidogrel Tablet 75 milliGRAM(s) Oral daily  famotidine    Tablet 20 milliGRAM(s) Oral daily  losartan 100 milliGRAM(s) Oral daily  melatonin 3 milliGRAM(s) Oral at bedtime PRN  metoprolol succinate  milliGRAM(s) Oral daily  ondansetron Injectable 4 milliGRAM(s) IV Push every 8 hours PRN  ranolazine 500 milliGRAM(s) Oral two times a day  sodium chloride 0.9%. 1000 milliLiter(s) IV Continuous <Continuous>    -------------------------------------------------------------------------------------------  [facility] 4/3/23:  1. Severe ISR of pLCx, mLCx and OM2 stents s/p PCI with ROBERTO x 2 and  POBA to the OM2  2. IVUS was used for PCI optimization   Recommendations:   Continue DAPT     NST 3/31/23:  IMPRESSIONS:Abnormal Study  * Exercise capacity: 8 METS, Fair for age and gender. 84%  of MPHR.  * Chest Pain: Developed grade 4 sharp midsternal chest  discomfort (on a scale of 1 to 10) at 05:35 min of  exercise at a HR of 117 which resolved by 27 minutes of  recovery.  * Symptom: Sharp midsternal chest pain, Dyspnea.  * HR Response: HR failed to increase appropiately,  likely  due to medications.  * BP Response: Blunted-inadequate elevation with stress.  * Heart Rhythm: Normal Sinus Rhythm - 83 BPM.  * Baseline ECG: T wave abnormality in aVL.  * ECG Changes: ST Depression: 1-2 mm horizontal to  upsloping in leads II , III, aVR, aVF, V4, V5, V6 started  at 03:41 min of exercise at HR of 110 and persisted 06:00  min into recovery.  * Arrhythmia: Single APD occurred during stress.  * Boyce Score: 8  * There are medium sized, severe defects in the apical and  inferior walls that are fixed, consistent with  Infarct.There is a medium sized, moderate defect in  lateral wall thatis reversible consistent with ischemia.  * Post-stress gated wall motion analysis was performed  (LVEF = 29 %;LVEDV = 143 ml.), revealing severe overall  hypokinesis.  Conclusion:  There are medium sized, severe defects in the apical and  inferior walls that are fixed, consistent with  Infarct.There is a medium sized, moderate defect in  lateral wall that is reversible consistent with ischemia.    TTE 3/30/23:  Ejection Fraction (Modified Chen Rule): 62 %  ------------------------------------------------------------------------  OBSERVATIONS:  Mitral Valve: Mitral annular calcification, otherwise  normal mitral valve. Minimal mitral regurgitation.  Aortic Root: Normal size aortic root. (Ao:3.3 cm).  Aortic Valve: Calcified trileaflet aortic valve with normal  opening. Minimal aortic regurgitation.  Left Atrium: Normal left atrium.  LA volume index = 17  cc/m2.  Left Ventricle: Normal left ventricular systolic function.  No segmental wall motion abnormalities. Normal left  ventricular internal dimensions and wall thicknesses.  Right Heart: Normal right atrium. Normal right ventricular  size and function. Normal tricuspid valve.  Minimal  tricuspid regurgitation. Normal pulmonic valve.  Pericardium/PleuraNormal pericardium with no pericardial  effusion.  ------------------------------------------------------------------------  CONCLUSIONS:  1. Mitral annular calcification, otherwise normal mitral  valve. Minimal mitral regurgitation.  2. Normal left ventricular internal dimensions and wall  thicknesses.  3. Normal left ventricular systolic function. No segmental  wall motion abnormalities.  4. Normal right ventricular size and function.      -------------------------------------------------------------------------------------------  
  Patient is a 63y old  Male who presents with a chief complaint of Chest pain (30 Mar 2023 12:56)    HPI:  62 y/o M with PMH of HTN, HLD, CAD s/p stents x2 (2015), GERD who presents with chest pain. Patient reports chest pain started yesterday morning while patient was on his way to work. States chest pain occurs intermittently and with exertion and resolved with rest. He has had 2 episodes of chest pain since pain began yesterday. Patient describes chest pain as a burning sensation that is located over lateral chest wall and axillary region bilaterally and radiates towards the center of his chest and back. Patient states pain is similar to chest pain he experienced prior to stent placement in 2015. He denies diaphoresis, SOB at rest or with exertion, arm pain, jaw pain, nausea, vomiting, LE edema, orthopnea. Patient follows with cardiologist Dr. Washington. Pt states he has been chest pain-free since he had stents placed in 2015. States he is adherent to aspirin and medications at home.     Pt endorses had cough, rhinorrhea, and chills for the past 2 days.     In ED, vital signs within normal limits. Labs showed troponin T 20, proBNP 72, no leukocytosis. Chest XR shows clear lungs. ECG: NSR, no ST changes, no TWI, no Q waves.      INTERVAL HPI/OVERNIGHT EVENTS:   No overnight events. Hemodynamically stable     Pt denies chest pain and SOB. Has been OOB and walking in the hallways without any chest pain. Eager to go home    tele: NSR, HR 80s, no ectopy    Subjective:    ICU Vital Signs Last 24 Hrs  T(C): 37.1 (31 Mar 2023 04:30), Max: 37.1 (31 Mar 2023 04:30)  T(F): 98.7 (31 Mar 2023 04:30), Max: 98.7 (31 Mar 2023 04:30)  HR: 75 (31 Mar 2023 04:30) (70 - 75)  BP: 135/60 (31 Mar 2023 04:30) (124/65 - 135/60)  BP(mean): --  ABP: --  ABP(mean): --  RR: 18 (31 Mar 2023 04:30) (18 - 18)  SpO2: 100% (31 Mar 2023 04:30) (100% - 100%)    O2 Parameters below as of 31 Mar 2023 04:30  Patient On (Oxygen Delivery Method): room air          I&O's Summary        Daily     Daily     Adult Advanced Hemodynamics Last 24 Hrs  CVP(mm Hg): --  CVP(cm H2O): --  CO: --  CI: --  PA: --  PA(mean): --  PCWP: --  SVR: --  SVRI: --  PVR: --  PVRI: --      MEDICATIONS  (STANDING):  aspirin enteric coated 81 milliGRAM(s) Oral daily  atorvastatin 80 milliGRAM(s) Oral at bedtime  cholecalciferol 2000 Unit(s) Oral daily  famotidine    Tablet 20 milliGRAM(s) Oral daily  losartan 100 milliGRAM(s) Oral daily  metoprolol succinate  milliGRAM(s) Oral daily  ranolazine 500 milliGRAM(s) Oral two times a day  sodium chloride 0.9%. 1000 milliLiter(s) (75 mL/Hr) IV Continuous <Continuous>    MEDICATIONS  (PRN):      PHYSICAL EXAM:  General Appearance: NAD	  Neck: no JVD  Cardiovascular: RRR, normal S1 and S2, no murmurs, rubs, or gallops. No LE edema  Respiratory: CTAB, no crackles, rhonchi or wheezes  Gastrointestinal: nondistended, soft, no epigastric tenderness  Neurologic: no focal deficits, A&Ox3      LABS:                        14.7   10.67 )-----------( 164      ( 31 Mar 2023 06:00 )             44.5     03-31    134<L>  |  101  |  15  ----------------------------<  104<H>  3.8   |  21<L>  |  1.10    Ca    8.6      31 Mar 2023 06:00  Phos  2.6     03-31  Mg     1.80     03-31          CAPILLARY BLOOD GLUCOSE                      RADIOLOGY & ADDITIONAL TESTS:  CXR:        Care Discussed with Consultants/Other Providers [ x] YES  [ ] NO        
[facility] Division of Hospital Medicine  Stephen Taylor DO  Available via MS Teams  In house pager [phone]    SUBJECTIVE / OVERNIGHT EVENTS:  No acute events overnight. No acute complaints.  Patient says he wants to go home today. I told him and his daughter over the phone that he is scheduled for the 2nd part of the nuclear stress test tomorrow.  He was upset but understood and agreed to stay.    ADDITIONAL REVIEW OF SYSTEMS:    MEDICATIONS  (STANDING):  aspirin enteric coated 81 milliGRAM(s) Oral daily  atorvastatin 80 milliGRAM(s) Oral at bedtime  cholecalciferol 2000 Unit(s) Oral daily  famotidine    Tablet 20 milliGRAM(s) Oral daily  losartan 100 milliGRAM(s) Oral daily  metoprolol succinate  milliGRAM(s) Oral daily  ranolazine 500 milliGRAM(s) Oral two times a day    MEDICATIONS  (PRN):      I&O's Summary      PHYSICAL EXAM:  Vital Signs Last 24 Hrs  T(C): 36.7 (01 Apr 2023 05:04), Max: 37 (31 Mar 2023 20:24)  T(F): 98 (01 Apr 2023 05:04), Max: 98.6 (31 Mar 2023 20:24)  HR: 87 (01 Apr 2023 05:04) (76 - 87)  BP: 105/59 (01 Apr 2023 05:04) (105/59 - 136/85)  BP(mean): --  RR: 17 (01 Apr 2023 05:04) (17 - 19)  SpO2: 97% (01 Apr 2023 05:04) (97% - 100%)    Parameters below as of 01 Apr 2023 05:04  Patient On (Oxygen Delivery Method): room air      CONSTITUTIONAL: NAD, well-developed, well-groomed  EYES: PERRLA; conjunctiva and sclera clear  ENMT: Moist oral mucosa, no pharyngeal injection or exudates; normal dentition  NECK: Supple, no palpable masses; no thyromegaly  RESPIRATORY: Normal respiratory effort; lungs are clear to auscultation bilaterally  CARDIOVASCULAR: Regular rate and rhythm, normal S1 and S2, no murmur/rub/gallop; No lower extremity edema; Peripheral pulses are 2+ bilaterally  ABDOMEN: Nontender to palpation, normoactive bowel sounds, no rebound/guarding; No hepatosplenomegaly  MUSCULOSKELETAL:  Normal gait; no clubbing or cyanosis of digits; no joint swelling or tenderness to palpation  PSYCH: A+O to person, place, and time; affect appropriate  NEUROLOGY: CN 2-12 are intact and symmetric; no gross sensory deficits   SKIN: No rashes; no palpable lesions    LABS:                        16.3   10.59 )-----------( 211      ( 01 Apr 2023 06:35 )             48.5     04-01    138  |  103  |  13  ----------------------------<  110<H>  4.0   |  19<L>  |  1.02    Ca    9.4      01 Apr 2023 06:35  Phos  3.1     04-01  Mg     2.10     04-01                SARS-CoV-2: NotDetec (30 Mar 2023 10:36)  COVID-19 PCR: NotDetec (29 Mar 2023 12:59)        COMMUNICATION:  Care Discussed with Consultants/Other Providers and Details of Discussion: discussed with medicine PA  Discussions with Patient/Family: discussed with daughter over the phone while at bedside
Patient is a 63y old  Male who presents with a chief complaint of Chest pain (31 Mar 2023 13:04)      SUBJECTIVE / OVERNIGHT EVENTS: Pt seen and examined at 12:25pm in the stress lab, no overnight events, [place] interpreted by daughter Jackelin, pt reports minimal cough, no sob, chest pain or any other complaints. No other new issues reported.      MEDICATIONS  (STANDING):  aspirin enteric coated 81 milliGRAM(s) Oral daily  atorvastatin 80 milliGRAM(s) Oral at bedtime  cholecalciferol 2000 Unit(s) Oral daily  famotidine    Tablet 20 milliGRAM(s) Oral daily  losartan 100 milliGRAM(s) Oral daily  metoprolol succinate  milliGRAM(s) Oral daily  ranolazine 500 milliGRAM(s) Oral two times a day  sodium chloride 0.9%. 1000 milliLiter(s) (75 mL/Hr) IV Continuous <Continuous>    MEDICATIONS  (PRN):      Vital Signs Last 24 Hrs  T(C): 37.1 (31 Mar 2023 04:30), Max: 37.1 (31 Mar 2023 04:30)  T(F): 98.7 (31 Mar 2023 04:30), Max: 98.7 (31 Mar 2023 04:30)  HR: 75 (31 Mar 2023 04:30) (70 - 75)  BP: 135/60 (31 Mar 2023 04:30) (124/65 - 135/60)  BP(mean): --  RR: 18 (31 Mar 2023 04:30) (18 - 18)  SpO2: 100% (31 Mar 2023 04:30) (100% - 100%)    Parameters below as of 31 Mar 2023 04:30  Patient On (Oxygen Delivery Method): room air      CAPILLARY BLOOD GLUCOSE        I&O's Summary      PHYSICAL EXAM:  GENERAL: NAD, well-developed  CHEST/LUNG: Clear to auscultation bilaterally; No wheeze  HEART: Regular rate and rhythm  ABDOMEN: Soft, Nontender, Nondistended  EXTREMITIES: no LE edema  PSYCH: Calm  NEUROLOGY: AAOx3  SKIN: No rashes or lesions    LABS:                        14.7   10.67 )-----------( 164      ( 31 Mar 2023 06:00 )             44.5     03-31    134<L>  |  101  |  15  ----------------------------<  104<H>  3.8   |  21<L>  |  1.10    Ca    8.6      31 Mar 2023 06:00  Phos  2.6     03-31  Mg     1.80     03-31                RADIOLOGY & ADDITIONAL TESTS:    Imaging Personally Reviewed:    Consultant(s) Notes Reviewed:      Care Discussed with Consultants/Other Providers:  
[facility] Division of Hospital Medicine  Stephen Taylor DO  Available via MS Teams  In house pager [phone]    SUBJECTIVE / OVERNIGHT EVENTS:  No acute events overnight. No chest pain at present.  Denies acute complaints.    He is eager for discharge, awaiting the stress test.    ADDITIONAL REVIEW OF SYSTEMS:    MEDICATIONS  (STANDING):  aspirin enteric coated 81 milliGRAM(s) Oral daily  atorvastatin 80 milliGRAM(s) Oral at bedtime  cholecalciferol 2000 Unit(s) Oral daily  famotidine    Tablet 20 milliGRAM(s) Oral daily  losartan 100 milliGRAM(s) Oral daily  metoprolol succinate  milliGRAM(s) Oral daily  ranolazine 500 milliGRAM(s) Oral two times a day    MEDICATIONS  (PRN):      I&O's Summary      PHYSICAL EXAM:  Vital Signs Last 24 Hrs  T(C): 36.7 (02 Apr 2023 10:29), Max: 36.8 (02 Apr 2023 04:43)  T(F): 98.1 (02 Apr 2023 10:29), Max: 98.2 (02 Apr 2023 04:43)  HR: 72 (02 Apr 2023 10:29) (72 - 109)  BP: 139/90 (02 Apr 2023 10:29) (121/74 - 139/90)  BP(mean): --  RR: 18 (02 Apr 2023 10:29) (18 - 18)  SpO2: 100% (02 Apr 2023 10:29) (99% - 100%)    Parameters below as of 02 Apr 2023 10:29  Patient On (Oxygen Delivery Method): room air      CONSTITUTIONAL: NAD, well-developed, well-groomed  EYES: PERRLA; conjunctiva and sclera clear  ENMT: Moist oral mucosa, no pharyngeal injection or exudates; normal dentition  NECK: Supple, no palpable masses; no thyromegaly  RESPIRATORY: Normal respiratory effort; lungs are clear to auscultation bilaterally  CARDIOVASCULAR: Regular rate and rhythm, normal S1 and S2, no murmur/rub/gallop; No lower extremity edema; Peripheral pulses are 2+ bilaterally  ABDOMEN: Nontender to palpation, normoactive bowel sounds, no rebound/guarding; No hepatosplenomegaly  MUSCULOSKELETAL:  Normal gait; no clubbing or cyanosis of digits; no joint swelling or tenderness to palpation  PSYCH: A+O to person, place, and time; affect appropriate  NEUROLOGY: CN 2-12 are intact and symmetric; no gross sensory deficits   SKIN: No rashes; no palpable lesions    LABS:                        15.5   9.37  )-----------( 174      ( 02 Apr 2023 05:02 )             45.5     04-02    133<L>  |  100  |  18  ----------------------------<  107<H>  3.9   |  20<L>  |  1.27    Ca    9.0      02 Apr 2023 05:02  Phos  4.3     04-02  Mg     2.00     04-02                SARS-CoV-2: NotDetec (30 Mar 2023 10:36)  COVID-19 PCR: NotDetec (29 Mar 2023 12:59)          COMMUNICATION:  Care Discussed with Consultants/Other Providers and Details of Discussion: d/w medicine PA
[facility] Division of Hospital Medicine  Julita Kruger DO  Pager (M-F, 8A-5P): [phone]      Patient is a 63y old  Male who presents with a chief complaint of Chest pain (03 Apr 2023 10:45)      SUBJECTIVE / OVERNIGHT EVENTS: no overnight events, pt seen at bedside, son present. Denies cp/sob, pending cath  ADDITIONAL REVIEW OF SYSTEMS: no cp/sob     MEDICATIONS  (STANDING):  aspirin enteric coated 81 milliGRAM(s) Oral daily  atorvastatin 80 milliGRAM(s) Oral at bedtime  cholecalciferol 2000 Unit(s) Oral daily  famotidine    Tablet 20 milliGRAM(s) Oral daily  losartan 100 milliGRAM(s) Oral daily  metoprolol succinate  milliGRAM(s) Oral daily  ranolazine 500 milliGRAM(s) Oral two times a day    MEDICATIONS  (PRN):  acetaminophen     Tablet .. 650 milliGRAM(s) Oral every 6 hours PRN Temp greater or equal to 38C (100.4F), Mild Pain (1 - 3)  aluminum hydroxide/magnesium hydroxide/simethicone Suspension 30 milliLiter(s) Oral every 4 hours PRN Dyspepsia  melatonin 3 milliGRAM(s) Oral at bedtime PRN Insomnia  ondansetron Injectable 4 milliGRAM(s) IV Push every 8 hours PRN Nausea and/or Vomiting      CAPILLARY BLOOD GLUCOSE        I&O's Summary      PHYSICAL EXAM:  Vital Signs Last 24 Hrs  T(C): 37.1 (03 Apr 2023 11:39), Max: 37.1 (03 Apr 2023 11:39)  T(F): 98.7 (03 Apr 2023 11:39), Max: 98.7 (03 Apr 2023 11:39)  HR: 68 (03 Apr 2023 11:39) (68 - 77)  BP: 116/65 (03 Apr 2023 11:39) (115/63 - 137/83)  BP(mean): --  RR: 18 (03 Apr 2023 11:39) (16 - 18)  SpO2: 99% (03 Apr 2023 11:39) (99% - 100%)    Parameters below as of 03 Apr 2023 11:39  Patient On (Oxygen Delivery Method): room air      GENERAL: NAD, well-developed  CHEST/LUNG: Clear to auscultation bilaterally; No wheeze  HEART: Regular rate and rhythm  ABDOMEN: Soft, Nontender, Nondistended  EXTREMITIES: no LE edema  PSYCH: Calm  NEUROLOGY: AAOx3  SKIN: No rashes or lesions    LABS:                        15.7   9.73  )-----------( 202      ( 03 Apr 2023 07:12 )             46.2     04-03    137  |  104  |  16  ----------------------------<  115<H>  4.4   |  19<L>  |  1.30    Ca    9.2      03 Apr 2023 07:12  Phos  3.1     04-03  Mg     2.00     04-03                  RADIOLOGY & ADDITIONAL TESTS:  Results Reviewed:   Imaging Personally Reviewed:  Electrocardiogram Personally Reviewed:    COORDINATION OF CARE:  Care Discussed with Consultants/Other Providers [Y/N]: Y  Prior or Outpatient Records Reviewed [Y/N]: Y  
Patient is a 63y old  Male who presents with a chief complaint of Chest pain (29 Mar 2023 17:51)      SUBJECTIVE / OVERNIGHT EVENTS: Pt seen and examined at 11:10am, no overnight events, pt is found to be walking around on the floor, denies any sob, chest pain or any other complaints. No other new issues reported.    MEDICATIONS  (STANDING):  aspirin enteric coated 81 milliGRAM(s) Oral daily  atorvastatin 80 milliGRAM(s) Oral at bedtime  cholecalciferol 2000 Unit(s) Oral daily  famotidine    Tablet 20 milliGRAM(s) Oral daily  losartan 100 milliGRAM(s) Oral daily  metoprolol succinate  milliGRAM(s) Oral daily  ranolazine 500 milliGRAM(s) Oral two times a day  sodium chloride 0.9%. 1000 milliLiter(s) (75 mL/Hr) IV Continuous <Continuous>    MEDICATIONS  (PRN):      Vital Signs Last 24 Hrs  T(C): 36.6 (30 Mar 2023 11:16), Max: 36.7 (29 Mar 2023 18:37)  T(F): 97.8 (30 Mar 2023 11:16), Max: 98 (29 Mar 2023 18:37)  HR: 82 (30 Mar 2023 11:16) (72 - 82)  BP: 140/91 (30 Mar 2023 11:16) (139/72 - 142/75)  BP(mean): --  RR: 18 (30 Mar 2023 11:16) (16 - 18)  SpO2: 100% (30 Mar 2023 11:16) (100% - 100%)    Parameters below as of 30 Mar 2023 11:16  Patient On (Oxygen Delivery Method): room air      CAPILLARY BLOOD GLUCOSE        I&O's Summary    29 Mar 2023 07:01  -  30 Mar 2023 07:00  --------------------------------------------------------  IN: 200 mL / OUT: 0 mL / NET: 200 mL        PHYSICAL EXAM:  GENERAL: NAD, well-developed  CHEST/LUNG: Clear to auscultation bilaterally; No wheeze  HEART: Regular rate and rhythm  ABDOMEN: Soft, Nontender, Nondistended  EXTREMITIES: no LE edema  PSYCH: Calm  NEUROLOGY: AAOx3  SKIN: No rashes or lesions    LABS:                        14.8   9.25  )-----------( 164      ( 30 Mar 2023 06:00 )             45.2     03-30    138  |  101  |  22  ----------------------------<  98  4.2   |  23  |  1.35<H>    Ca    8.7      30 Mar 2023 06:00  Phos  3.2     03-30  Mg     1.90     03-30    TPro  6.9  /  Alb  4.1  /  TBili  0.5  /  DBili  x   /  AST  27  /  ALT  22  /  AlkPhos  68  03-29    PT/INR - ( 29 Mar 2023 12:36 )   PT: 12.2 sec;   INR: 1.05 ratio                   RADIOLOGY & ADDITIONAL TESTS:  < from: Transthoracic Echocardiogram (03.30.23 @ 07:14) >   Mitral annular calcification, otherwise normal mitral  valve. Minimal mitral regurgitation.  2. Normal left ventricular internal dimensions and wall  thicknesses.  3. Normal left ventricular systolic function. No segmental  wall motion abnormalities.  4. Normal right ventricular size and function.    < end of copied text >    Imaging Personally Reviewed:    Consultant(s) Notes Reviewed:      Care Discussed with Consultants/Other Providers:

## 2023-04-11 ENCOUNTER — APPOINTMENT (OUTPATIENT)
Dept: UROLOGY | Facility: CLINIC | Age: 63
End: 2023-04-11
Payer: MEDICAID

## 2023-04-11 VITALS
OXYGEN SATURATION: 98 % | HEIGHT: 66 IN | DIASTOLIC BLOOD PRESSURE: 87 MMHG | HEART RATE: 74 BPM | BODY MASS INDEX: 30.86 KG/M2 | TEMPERATURE: 98.4 F | SYSTOLIC BLOOD PRESSURE: 137 MMHG | WEIGHT: 192 LBS

## 2023-04-11 PROCEDURE — 99204 OFFICE O/P NEW MOD 45 MIN: CPT

## 2023-04-11 NOTE — ASSESSMENT
[FreeTextEntry1] : Mr. Olivas is a very pleasant 63 year old man here today for gross hematuria.\par Accompanied by son who is acting as  per his breast\par -PSA\par -BMP\par -urinalysis\par -urine culture\par -urine cytology\par -CT Urogram\par -cystoscopy\par -We discussed AUA guidelines regarding risk stratification for microscopic hematuria\par -Extensive discussion of the potential etiologies of hematuria, as well as the need to complete full work up for evaluation of cancer or other  sources of hematuria.  Patient understands and wishes to proceed.

## 2023-04-11 NOTE — HISTORY OF PRESENT ILLNESS
[Currently Experiencing ___] :  [unfilled] [Hematuria - Gross] : gross hematuria [FreeTextEntry1] : Mr. Olivas is a very pleasant 63 year old man here today for gross hematuria.\par Accompanied by son who is acting as  per his preference.\par Reports that a week ago he was in the hospital for chest pain and was started on Plavix.\par Reports that 3 days ago he started notices blood tinged urine with small clots.\par Reports it is at the beginning of the stream.\par Denies any prior episodes of hematuria.\par Denies any dysuria or straining.\par Reports a normal urinary stream.\par Denies any personal or family history of kidneys tones.\par Denies any family history of urological cancers.\par Former smoker, quit 15 years ago.

## 2023-04-12 LAB
ANION GAP SERPL CALC-SCNC: 12 MMOL/L
APPEARANCE: CLEAR
BACTERIA: NEGATIVE
BILIRUBIN URINE: NEGATIVE
BLOOD URINE: ABNORMAL
BUN SERPL-MCNC: 10 MG/DL
CALCIUM SERPL-MCNC: 9.2 MG/DL
CHLORIDE SERPL-SCNC: 96 MMOL/L
CO2 SERPL-SCNC: 25 MMOL/L
COLOR: COLORLESS
CREAT SERPL-MCNC: 1.1 MG/DL
EGFR: 75 ML/MIN/1.73M2
GLUCOSE QUALITATIVE U: NEGATIVE
GLUCOSE SERPL-MCNC: 87 MG/DL
HYALINE CASTS: 0 /LPF
KETONES URINE: NEGATIVE
LEUKOCYTE ESTERASE URINE: NEGATIVE
MICROSCOPIC-UA: NORMAL
NITRITE URINE: NEGATIVE
PH URINE: 8
POTASSIUM SERPL-SCNC: 4.3 MMOL/L
PROTEIN URINE: NEGATIVE
PSA SERPL-MCNC: 0.79 NG/ML
RED BLOOD CELLS URINE: 3 /HPF
SODIUM SERPL-SCNC: 132 MMOL/L
SPECIFIC GRAVITY URINE: 1
SQUAMOUS EPITHELIAL CELLS: 0 /HPF
URINE CYTOLOGY: NORMAL
UROBILINOGEN URINE: NORMAL
WHITE BLOOD CELLS URINE: 0 /HPF

## 2023-04-13 LAB — BACTERIA UR CULT: NORMAL

## 2023-04-19 ENCOUNTER — NON-APPOINTMENT (OUTPATIENT)
Age: 63
End: 2023-04-19

## 2023-04-19 ENCOUNTER — APPOINTMENT (OUTPATIENT)
Dept: CARDIOLOGY | Facility: CLINIC | Age: 63
End: 2023-04-19
Payer: MEDICAID

## 2023-04-19 VITALS
OXYGEN SATURATION: 97 % | SYSTOLIC BLOOD PRESSURE: 152 MMHG | DIASTOLIC BLOOD PRESSURE: 89 MMHG | HEIGHT: 66 IN | WEIGHT: 192 LBS | BODY MASS INDEX: 30.86 KG/M2 | HEART RATE: 63 BPM

## 2023-04-19 PROCEDURE — 93000 ELECTROCARDIOGRAM COMPLETE: CPT

## 2023-04-19 PROCEDURE — 99214 OFFICE O/P EST MOD 30 MIN: CPT | Mod: 25

## 2023-04-19 RX ORDER — ASPIRIN 81 MG/1
81 TABLET, COATED ORAL
Qty: 30 | Refills: 0 | Status: DISCONTINUED | COMMUNITY
Start: 2022-07-05 | End: 2023-04-19

## 2023-04-19 RX ORDER — BETAMETHASONE DIPROPIONATE 0.5 MG/G
0.05 OINTMENT, AUGMENTED TOPICAL
Qty: 50 | Refills: 0 | Status: DISCONTINUED | COMMUNITY
Start: 2022-04-02 | End: 2023-04-19

## 2023-04-19 RX ORDER — DEXTROMETHORPHAN HYDROBROMIDE, GUAIFENESIN 10; 100 MG/5ML; MG/5ML
10-100 LIQUID ORAL
Qty: 450 | Refills: 0 | Status: DISCONTINUED | COMMUNITY
Start: 2022-07-29 | End: 2023-04-19

## 2023-04-19 RX ORDER — LOSARTAN POTASSIUM 100 MG/1
100 TABLET, FILM COATED ORAL DAILY
Qty: 90 | Refills: 3 | Status: DISCONTINUED | COMMUNITY
End: 2023-04-19

## 2023-04-19 RX ORDER — HYDROCHLOROTHIAZIDE 12.5 MG/1
12.5 CAPSULE ORAL
Qty: 90 | Refills: 3 | Status: DISCONTINUED | COMMUNITY
Start: 2020-10-28 | End: 2023-04-19

## 2023-04-19 NOTE — HISTORY OF PRESENT ILLNESS
[FreeTextEntry1] : 60 year HTN, Hyperlipidemia, CAD s.p PCI with ROBERTO to LAD and LCX in 2015, Normal LVEF.  recent ACS/ s/p PCI with ROBERTO to LCx. \par Patient denies chest pain, dyspnea, orthopnea, PND, edema, palpitations, syncope or presyncope. \par Patient is compliant with their medications.\par \par

## 2023-04-19 NOTE — DISCUSSION/SUMMARY
[FreeTextEntry1] : 1. CAD: asymptomatic. continue witH asa/CLOPIDOGREL and aggressive risk factor modification  \par \par 2. HTN:BP  at goal.  , recommend compliance with meds/ \par \par 3. Hyperlipidemia: FU with PCP. continue statin\par \par 4. Hematuria: improved. he will fu Urology \par  [EKG obtained to assist in diagnosis and management of assessed problem(s)] : EKG obtained to assist in diagnosis and management of assessed problem(s)

## 2023-04-19 NOTE — PHYSICAL EXAM
[General Appearance - Well Developed] : well developed [General Appearance - Well Nourished] : well nourished [Normal Conjunctiva] : the conjunctiva exhibited no abnormalities [Normal Oral Mucosa] : normal oral mucosa [Normal Oropharynx] : normal oropharynx [Normal Jugular Venous V Waves Present] : normal jugular venous V waves present [Respiration, Rhythm And Depth] : normal respiratory rhythm and effort [] : no respiratory distress [Heart Rate And Rhythm] : heart rate and rhythm were normal [Heart Sounds] : normal S1 and S2 [Bowel Sounds] : normal bowel sounds [Abnormal Walk] : normal gait [FreeTextEntry1] : trace edema. [Skin Color & Pigmentation] : normal skin color and pigmentation [Oriented To Time, Place, And Person] : oriented to person, place, and time

## 2023-05-05 ENCOUNTER — APPOINTMENT (OUTPATIENT)
Dept: UROLOGY | Facility: CLINIC | Age: 63
End: 2023-05-05
Payer: MEDICAID

## 2023-05-05 PROCEDURE — 99214 OFFICE O/P EST MOD 30 MIN: CPT

## 2023-05-05 NOTE — HISTORY OF PRESENT ILLNESS
[FreeTextEntry1] : Patient accompanied by his nephew who he has requested to translate.  [employer] interpretation services offered but declined\par \par Very pleasant 63-year-old gentleman who presents for follow-up of gross hematuria.  He reports no additional episodes of gross hematuria at this time.  He underwent a CT urogram at [facility] on 4/28/2023, however this has not yet been read by a radiologist.  On personal review of the images there are no kidney stones, hydronephrosis, nor renal masses.\par \par He wishes to forego a cystoscopy to complete work-up for hematuria.

## 2023-05-05 NOTE — ASSESSMENT
[FreeTextEntry1] : Very pleasant 63-year-old gentleman who presents for follow-up of gross hematuria\par -PSA 0.79\par -Creatinine 1.1\par -Urinalysis demonstrates large blood and 3 red blood cells per high-powered field\par -We discussed abnormal urinalysis which continues to demonstrate hematuria\par -Urine cytology negative for high-grade urothelial carcinoma\par -Urine culture negative\par -CT images reviewed from [facility] demonstrating no kidney stones, hydronephrosis, renal masses on personal review of the images.;  Pending final radiology read\par -We again discussed my recommendation to undergo a cystoscopy for thorough evaluation for causes of microscopic hematuria.  We discussed the risk of undiagnosed urothelial malignancy in the bladder or urethra.  He understands these risks yet still wishes to forego the exam\par -Follow-up in 6 months

## 2023-05-17 ENCOUNTER — APPOINTMENT (OUTPATIENT)
Dept: CARDIOLOGY | Facility: CLINIC | Age: 63
End: 2023-05-17
Payer: MEDICAID

## 2023-05-17 ENCOUNTER — NON-APPOINTMENT (OUTPATIENT)
Age: 63
End: 2023-05-17

## 2023-05-17 VITALS
DIASTOLIC BLOOD PRESSURE: 78 MMHG | BODY MASS INDEX: 30.86 KG/M2 | WEIGHT: 192 LBS | OXYGEN SATURATION: 99 % | SYSTOLIC BLOOD PRESSURE: 136 MMHG | HEIGHT: 66 IN | HEART RATE: 64 BPM

## 2023-05-17 PROCEDURE — 99214 OFFICE O/P EST MOD 30 MIN: CPT | Mod: 25

## 2023-05-17 PROCEDURE — 93000 ELECTROCARDIOGRAM COMPLETE: CPT

## 2023-05-17 NOTE — DISCUSSION/SUMMARY
[FreeTextEntry1] : 1. CAD: asymptomatic. continue witH asa/CLOPIDOGREL and aggressive risk factor modification  \par \par 2. HTN:BP borderline, recommend compliance with meds/salt restriction sleep hygiene walking and dietary changes.\par \par 3. Hyperlipidemia: FU with PCP. continue statin\par \par 4. Hematuria: improved. he will fu Urology this has resolved\par  [EKG obtained to assist in diagnosis and management of assessed problem(s)] : EKG obtained to assist in diagnosis and management of assessed problem(s)

## 2023-05-17 NOTE — HISTORY OF PRESENT ILLNESS
[FreeTextEntry1] : 63 year HTN, Hyperlipidemia, CAD s.p PCI with ROBERTO to LAD and LCX in 2015, Normal LVEF.  recent ACS/ s/p PCI with ROBERTO to LCx. \par Patient denies chest pain, dyspnea, orthopnea, PND, edema, palpitations, syncope or presyncope. \par Patient is compliant with their medications.\par \par

## 2023-05-23 ENCOUNTER — APPOINTMENT (OUTPATIENT)
Dept: UROLOGY | Facility: CLINIC | Age: 63
End: 2023-05-23
Payer: MEDICAID

## 2023-05-23 VITALS
WEIGHT: 192 LBS | SYSTOLIC BLOOD PRESSURE: 118 MMHG | HEART RATE: 72 BPM | OXYGEN SATURATION: 98 % | TEMPERATURE: 97.3 F | DIASTOLIC BLOOD PRESSURE: 80 MMHG | HEIGHT: 66 IN | BODY MASS INDEX: 30.86 KG/M2

## 2023-05-23 DIAGNOSIS — R31.0 GROSS HEMATURIA: ICD-10-CM

## 2023-05-23 DIAGNOSIS — N40.0 BENIGN PROSTATIC HYPERPLASIA WITHOUT LOWER URINARY TRACT SYMPMS: ICD-10-CM

## 2023-05-23 PROCEDURE — 52000 CYSTOURETHROSCOPY: CPT

## 2023-05-23 PROCEDURE — 99213 OFFICE O/P EST LOW 20 MIN: CPT | Mod: 25

## 2023-05-23 NOTE — HISTORY OF PRESENT ILLNESS
[FreeTextEntry1] : Patient accompanied by his nephew who he has requested to translate.  [employer] interpretation services offered but declined\par \par Very pleasant 63-year-old gentleman who presents for follow-up of gross hematuria.  He reports that he has not experienced another episode of gross hematuria.  He underwent a CT scan at [facility] on 4/28/2023 which demonstrates no kidney stones, hydronephrosis, nor renal masses.\par \par He underwent a cystoscopy today which demonstrates no urothelial lesions.  It does demonstrate a slightly enlarged prostate.

## 2023-05-23 NOTE — PHYSICAL EXAM
[General Appearance - Well Developed] : well developed [General Appearance - Well Nourished] : well nourished [Normal Appearance] : normal appearance [Well Groomed] : well groomed [General Appearance - In No Acute Distress] : no acute distress [Abdomen Soft] : soft [Abdomen Tenderness] : non-tender [Costovertebral Angle Tenderness] : no ~M costovertebral angle tenderness [Urethral Meatus] : meatus normal [Urinary Bladder Findings] : the bladder was normal on palpation [Scrotum] : the scrotum was normal [Testes Mass (___cm)] : there were no testicular masses [Edema] : no peripheral edema [] : no respiratory distress [Respiration, Rhythm And Depth] : normal respiratory rhythm and effort [Exaggerated Use Of Accessory Muscles For Inspiration] : no accessory muscle use [Affect] : the affect was normal [Oriented To Time, Place, And Person] : oriented to person, place, and time [Not Anxious] : not anxious [Mood] : the mood was normal [Normal Station and Gait] : the gait and station were normal for the patient's age [No Focal Deficits] : no focal deficits [No Palpable Adenopathy] : no palpable adenopathy

## 2023-05-23 NOTE — ASSESSMENT
[FreeTextEntry1] : Very pleasant 63-year-old gentleman who presents for follow-up of gross hematuria\par -CT images reviewed demonstrating no kidney stones, hydronephrosis, renal masses\par -Cystoscopy today demonstrates no urothelial lesions but does demonstrate a slightly enlarged prostate\par -PSA 0.79\par -Creatinine 1.10\par -Follow-up in 6 months with repeat urine studies

## 2023-09-06 ENCOUNTER — APPOINTMENT (OUTPATIENT)
Dept: CARDIOLOGY | Facility: CLINIC | Age: 63
End: 2023-09-06
Payer: MEDICAID

## 2023-09-06 ENCOUNTER — NON-APPOINTMENT (OUTPATIENT)
Age: 63
End: 2023-09-06

## 2023-09-06 VITALS
HEART RATE: 74 BPM | RESPIRATION RATE: 16 BRPM | WEIGHT: 188 LBS | DIASTOLIC BLOOD PRESSURE: 92 MMHG | BODY MASS INDEX: 30.22 KG/M2 | OXYGEN SATURATION: 99 % | SYSTOLIC BLOOD PRESSURE: 143 MMHG | HEIGHT: 66 IN

## 2023-09-06 DIAGNOSIS — I10 ESSENTIAL (PRIMARY) HYPERTENSION: ICD-10-CM

## 2023-09-06 DIAGNOSIS — I20.9 ANGINA PECTORIS, UNSPECIFIED: ICD-10-CM

## 2023-09-06 DIAGNOSIS — E78.5 HYPERLIPIDEMIA, UNSPECIFIED: ICD-10-CM

## 2023-09-06 PROCEDURE — 93000 ELECTROCARDIOGRAM COMPLETE: CPT

## 2023-09-06 PROCEDURE — 99214 OFFICE O/P EST MOD 30 MIN: CPT | Mod: 25

## 2023-09-06 RX ORDER — ATORVASTATIN CALCIUM 80 MG/1
80 TABLET, FILM COATED ORAL
Qty: 90 | Refills: 3 | Status: ACTIVE | COMMUNITY
Start: 1900-01-01 | End: 1900-01-01

## 2023-09-06 RX ORDER — METOPROLOL SUCCINATE 100 MG/1
100 TABLET, EXTENDED RELEASE ORAL DAILY
Qty: 135 | Refills: 3 | Status: ACTIVE | COMMUNITY

## 2023-09-06 RX ORDER — FAMOTIDINE 20 MG/1
20 TABLET, FILM COATED ORAL
Qty: 90 | Refills: 3 | Status: ACTIVE | COMMUNITY
Start: 1900-01-01 | End: 1900-01-01

## 2023-09-06 RX ORDER — RANOLAZINE 500 MG/1
500 TABLET, EXTENDED RELEASE ORAL
Qty: 180 | Refills: 3 | Status: ACTIVE | COMMUNITY
Start: 1900-01-01 | End: 1900-01-01

## 2023-09-06 RX ORDER — CLOPIDOGREL BISULFATE 75 MG/1
75 TABLET, FILM COATED ORAL DAILY
Qty: 1 | Refills: 1 | Status: ACTIVE | COMMUNITY
Start: 2023-04-19 | End: 1900-01-01

## 2023-09-06 RX ORDER — MELOXICAM 7.5 MG/1
7.5 TABLET ORAL
Qty: 15 | Refills: 0 | Status: DISCONTINUED | COMMUNITY
Start: 2022-04-02 | End: 2023-09-06

## 2023-09-06 RX ORDER — LOSARTAN POTASSIUM AND HYDROCHLOROTHIAZIDE 12.5; 1 MG/1; MG/1
100-12.5 TABLET ORAL DAILY
Qty: 90 | Refills: 3 | Status: ACTIVE | COMMUNITY
Start: 2022-07-05 | End: 1900-01-01

## 2023-09-06 NOTE — DISCUSSION/SUMMARY
[FreeTextEntry1] : 1. CAD: asymptomatic. continue witH asa/CLOPIDOGREL and aggressive risk factor modification.  He had PCI done to his left circumflex so we will continue with dual antiplatelet therapy on antianginals for sidebranch small vessel disease.  2. HTN: BP borderline, recommend compliance with meds/salt restriction sleep hygiene walking and dietary changes.  His blood pressure was normal on the prior visits so we will follow-up clinically.  3. Hyperlipidemia: FU with PCP. continue statin.  [EKG obtained to assist in diagnosis and management of assessed problem(s)] : EKG obtained to assist in diagnosis and management of assessed problem(s)

## 2023-10-05 NOTE — PROGRESS NOTE ADULT - PROBLEM SELECTOR PROBLEM 4
"----- Message from Jessica Paula sent at 10/5/2023  3:07 PM CDT -----  Reschedule Existing Appointment     Appt Date:11/09    Type of appt: NFL; F/u      Physician: Leroy    Reason for rescheduling?  Requesting to r/s for same day in the morning     Caller: Self    Contact Preference: [phone]                   Additional Information:  "Thank you for all that you do for our patients"         "
CAD (coronary artery disease)

## 2023-11-07 ENCOUNTER — APPOINTMENT (OUTPATIENT)
Dept: UROLOGY | Facility: CLINIC | Age: 63
End: 2023-11-07

## 2023-12-06 ENCOUNTER — APPOINTMENT (OUTPATIENT)
Dept: CARDIOLOGY | Facility: CLINIC | Age: 63
End: 2023-12-06

## 2023-12-13 ENCOUNTER — APPOINTMENT (OUTPATIENT)
Dept: CARDIOLOGY | Facility: CLINIC | Age: 63
End: 2023-12-13

## 2024-04-05 NOTE — PHYSICAL EXAM
[General Appearance - Well Developed] : well developed [General Appearance - Well Nourished] : well nourished [Normal Conjunctiva] : the conjunctiva exhibited no abnormalities [Normal Oral Mucosa] : normal oral mucosa I supervised this procedure in its entirety [Normal Oropharynx] : normal oropharynx [Normal Jugular Venous V Waves Present] : normal jugular venous V waves present [] : no respiratory distress [Respiration, Rhythm And Depth] : normal respiratory rhythm and effort [Heart Rate And Rhythm] : heart rate and rhythm were normal [Heart Sounds] : normal S1 and S2 [Bowel Sounds] : normal bowel sounds [Abnormal Walk] : normal gait [Skin Color & Pigmentation] : normal skin color and pigmentation [Oriented To Time, Place, And Person] : oriented to person, place, and time [FreeTextEntry1] : trace edema.

## 2024-08-21 ENCOUNTER — APPOINTMENT (OUTPATIENT)
Dept: CARDIOLOGY | Facility: CLINIC | Age: 64
End: 2024-08-21

## 2024-11-06 ENCOUNTER — NON-APPOINTMENT (OUTPATIENT)
Age: 64
End: 2024-11-06

## 2024-11-06 ENCOUNTER — APPOINTMENT (OUTPATIENT)
Dept: CARDIOLOGY | Facility: CLINIC | Age: 64
End: 2024-11-06
Payer: MEDICAID

## 2024-11-06 VITALS
HEIGHT: 66 IN | DIASTOLIC BLOOD PRESSURE: 88 MMHG | SYSTOLIC BLOOD PRESSURE: 150 MMHG | HEART RATE: 62 BPM | WEIGHT: 185 LBS | OXYGEN SATURATION: 99 % | BODY MASS INDEX: 29.73 KG/M2

## 2024-11-06 VITALS — SYSTOLIC BLOOD PRESSURE: 127 MMHG | DIASTOLIC BLOOD PRESSURE: 73 MMHG

## 2024-11-06 DIAGNOSIS — I25.10 ATHEROSCLEROTIC HEART DISEASE OF NATIVE CORONARY ARTERY W/OUT ANGINA PECTORIS: ICD-10-CM

## 2024-11-06 DIAGNOSIS — I10 ESSENTIAL (PRIMARY) HYPERTENSION: ICD-10-CM

## 2024-11-06 PROCEDURE — 99214 OFFICE O/P EST MOD 30 MIN: CPT

## 2024-11-06 PROCEDURE — 93000 ELECTROCARDIOGRAM COMPLETE: CPT

## 2025-03-06 NOTE — DISCUSSION/SUMMARY
Pt. declined offer to contact spouse  Adri HCP states he is independent and a MD does not need a caregiver.  States spouse is visiting today and will transport him home when ready.  yes  wife Adri Terrell [phone]   [FreeTextEntry1] : 1. CAD: asymptomatic. continue witH asa  indefinitely and aggressive risk factor modification . he has residual cad in small to medium sized OM branch and remains asymptomatic with combination of antianginals. \par \par 2. HTN:BP  at goal.  , recommend compliance with meds/ \par \par 3. Hyperlipidemia: FU with PCP. continue statin\par

## 2025-05-28 ENCOUNTER — APPOINTMENT (OUTPATIENT)
Dept: CARDIOLOGY | Facility: CLINIC | Age: 65
End: 2025-05-28

## 2025-05-30 NOTE — PATIENT PROFILE ADULT - VISION (WITH CORRECTIVE LENSES IF THE PATIENT USUALLY WEARS THEM):
Noted- can't force   Partially impaired: cannot see medication labels or newsprint, but can see obstacles in path, and the surrounding layout; can count fingers at arm's length

## 2025-08-05 ENCOUNTER — NON-APPOINTMENT (OUTPATIENT)
Age: 65
End: 2025-08-05

## 2025-08-05 ENCOUNTER — APPOINTMENT (OUTPATIENT)
Dept: CARDIOLOGY | Facility: HOSPITAL | Age: 65
End: 2025-08-05

## 2025-08-05 VITALS
OXYGEN SATURATION: 98 % | HEART RATE: 64 BPM | DIASTOLIC BLOOD PRESSURE: 86 MMHG | SYSTOLIC BLOOD PRESSURE: 142 MMHG | TEMPERATURE: 98.2 F

## 2025-08-05 DIAGNOSIS — E78.5 HYPERLIPIDEMIA, UNSPECIFIED: ICD-10-CM

## 2025-08-05 DIAGNOSIS — R93.1 ABNORMAL FINDINGS ON DIAGNOSTIC IMAGING OF HEART AND CORONARY CIRCULATION: ICD-10-CM

## 2025-08-05 DIAGNOSIS — I25.10 ATHEROSCLEROTIC HEART DISEASE OF NATIVE CORONARY ARTERY W/OUT ANGINA PECTORIS: ICD-10-CM

## 2025-08-05 DIAGNOSIS — I10 ESSENTIAL (PRIMARY) HYPERTENSION: ICD-10-CM

## 2025-08-05 DIAGNOSIS — I50.30 UNSPECIFIED DIASTOLIC (CONGESTIVE) HEART FAILURE: ICD-10-CM

## 2025-08-05 DIAGNOSIS — R60.0 LOCALIZED EDEMA: ICD-10-CM

## 2025-08-05 RX ORDER — FUROSEMIDE 20 MG/1
20 TABLET ORAL
Qty: 30 | Refills: 3 | Status: ACTIVE | COMMUNITY
Start: 2025-08-05 | End: 1900-01-01

## 2025-08-14 ENCOUNTER — RESULT REVIEW (OUTPATIENT)
Age: 65
End: 2025-08-14

## 2025-08-14 ENCOUNTER — APPOINTMENT (OUTPATIENT)
Dept: CV DIAGNOSITCS | Facility: HOSPITAL | Age: 65
End: 2025-08-14